# Patient Record
Sex: FEMALE | Race: WHITE | NOT HISPANIC OR LATINO | Employment: OTHER | URBAN - METROPOLITAN AREA
[De-identification: names, ages, dates, MRNs, and addresses within clinical notes are randomized per-mention and may not be internally consistent; named-entity substitution may affect disease eponyms.]

---

## 2023-04-01 DIAGNOSIS — J45.909 ASTHMA, UNSPECIFIED ASTHMA SEVERITY, UNSPECIFIED WHETHER COMPLICATED, UNSPECIFIED WHETHER PERSISTENT (HHS-HCC): Primary | ICD-10-CM

## 2023-04-12 RX ORDER — ALBUTEROL SULFATE 90 UG/1
AEROSOL, METERED RESPIRATORY (INHALATION)
Qty: 25.5 G | Refills: 6 | Status: SHIPPED | OUTPATIENT
Start: 2023-04-12

## 2023-04-17 ENCOUNTER — TELEPHONE (OUTPATIENT)
Dept: PRIMARY CARE | Facility: CLINIC | Age: 68
End: 2023-04-17
Payer: MEDICARE

## 2023-04-17 DIAGNOSIS — J06.9 URI, ACUTE: Primary | ICD-10-CM

## 2023-04-17 RX ORDER — METHYLPREDNISOLONE 4 MG/1
TABLET ORAL
Qty: 21 TABLET | Refills: 0 | Status: SHIPPED | OUTPATIENT
Start: 2023-04-17 | End: 2023-06-12 | Stop reason: ALTCHOICE

## 2023-04-17 RX ORDER — AZITHROMYCIN 250 MG/1
TABLET, FILM COATED ORAL
Qty: 6 TABLET | Refills: 0 | Status: SHIPPED | OUTPATIENT
Start: 2023-04-17 | End: 2023-04-22

## 2023-04-17 RX ORDER — BENZONATATE 200 MG/1
200 CAPSULE ORAL 3 TIMES DAILY PRN
Qty: 45 CAPSULE | Refills: 0 | Status: SHIPPED | OUTPATIENT
Start: 2023-04-17 | End: 2023-05-02

## 2023-04-17 NOTE — TELEPHONE ENCOUNTER
Pt called and states that she has a terrible cough that won't go away and it won't stop and is really bad. Pt would like to know if there is anything that you can prescribe for her. Pharmacy is updated and no allergies to any medication

## 2023-06-02 PROBLEM — F32.A ANXIETY AND DEPRESSION: Status: ACTIVE | Noted: 2023-06-02

## 2023-06-02 PROBLEM — F41.9 ANXIETY AND DEPRESSION: Status: ACTIVE | Noted: 2023-06-02

## 2023-06-02 PROBLEM — G25.81 RESTLESS LEG SYNDROME: Status: ACTIVE | Noted: 2023-06-02

## 2023-06-02 PROBLEM — R06.02 EXERTIONAL SHORTNESS OF BREATH: Status: ACTIVE | Noted: 2023-06-02

## 2023-06-02 PROBLEM — I10 BENIGN ESSENTIAL HTN: Status: ACTIVE | Noted: 2023-06-02

## 2023-06-02 PROBLEM — J44.9 CHRONIC OBSTRUCTIVE PULMONARY DISEASE (MULTI): Status: ACTIVE | Noted: 2023-06-02

## 2023-06-12 ENCOUNTER — APPOINTMENT (OUTPATIENT)
Dept: PRIMARY CARE | Facility: CLINIC | Age: 68
End: 2023-06-12
Payer: MEDICARE

## 2023-06-12 ENCOUNTER — OFFICE VISIT (OUTPATIENT)
Dept: PRIMARY CARE | Facility: CLINIC | Age: 68
End: 2023-06-12
Payer: MEDICARE

## 2023-06-12 VITALS
DIASTOLIC BLOOD PRESSURE: 84 MMHG | OXYGEN SATURATION: 96 % | SYSTOLIC BLOOD PRESSURE: 136 MMHG | HEART RATE: 84 BPM | WEIGHT: 196.4 LBS | BODY MASS INDEX: 27.39 KG/M2

## 2023-06-12 DIAGNOSIS — J44.1 COPD EXACERBATION (MULTI): ICD-10-CM

## 2023-06-12 DIAGNOSIS — J44.9 CHRONIC OBSTRUCTIVE PULMONARY DISEASE, UNSPECIFIED COPD TYPE (MULTI): Primary | ICD-10-CM

## 2023-06-12 PROBLEM — Z96.1 BILATERAL PSEUDOPHAKIA: Status: ACTIVE | Noted: 2019-04-30

## 2023-06-12 PROCEDURE — 3079F DIAST BP 80-89 MM HG: CPT | Performed by: STUDENT IN AN ORGANIZED HEALTH CARE EDUCATION/TRAINING PROGRAM

## 2023-06-12 PROCEDURE — 99214 OFFICE O/P EST MOD 30 MIN: CPT | Performed by: STUDENT IN AN ORGANIZED HEALTH CARE EDUCATION/TRAINING PROGRAM

## 2023-06-12 PROCEDURE — 94640 AIRWAY INHALATION TREATMENT: CPT | Performed by: STUDENT IN AN ORGANIZED HEALTH CARE EDUCATION/TRAINING PROGRAM

## 2023-06-12 PROCEDURE — 1160F RVW MEDS BY RX/DR IN RCRD: CPT | Performed by: STUDENT IN AN ORGANIZED HEALTH CARE EDUCATION/TRAINING PROGRAM

## 2023-06-12 PROCEDURE — 1159F MED LIST DOCD IN RCRD: CPT | Performed by: STUDENT IN AN ORGANIZED HEALTH CARE EDUCATION/TRAINING PROGRAM

## 2023-06-12 PROCEDURE — 3075F SYST BP GE 130 - 139MM HG: CPT | Performed by: STUDENT IN AN ORGANIZED HEALTH CARE EDUCATION/TRAINING PROGRAM

## 2023-06-12 RX ORDER — FLUTICASONE PROPIONATE AND SALMETEROL 100; 50 UG/1; UG/1
1 POWDER RESPIRATORY (INHALATION) 2 TIMES DAILY
COMMUNITY
End: 2023-06-12 | Stop reason: ALTCHOICE

## 2023-06-12 RX ORDER — SERTRALINE HYDROCHLORIDE 50 MG/1
50 TABLET, FILM COATED ORAL
COMMUNITY
End: 2023-06-12 | Stop reason: ALTCHOICE

## 2023-06-12 RX ORDER — ALBUTEROL SULFATE 0.63 MG/3ML
0.63 SOLUTION RESPIRATORY (INHALATION) ONCE
Status: COMPLETED | OUTPATIENT
Start: 2023-06-12 | End: 2023-06-12

## 2023-06-12 RX ORDER — CLARITHROMYCIN 500 MG/1
500 TABLET, FILM COATED ORAL 2 TIMES DAILY
Qty: 20 TABLET | Refills: 0 | Status: SHIPPED | OUTPATIENT
Start: 2023-06-12 | End: 2023-06-22

## 2023-06-12 RX ORDER — PREDNISONE 20 MG/1
TABLET ORAL
Qty: 18 TABLET | Refills: 0 | Status: SHIPPED | OUTPATIENT
Start: 2023-06-12 | End: 2023-07-26 | Stop reason: ALTCHOICE

## 2023-06-12 RX ORDER — BENZONATATE 200 MG/1
200 CAPSULE ORAL 3 TIMES DAILY PRN
Qty: 45 CAPSULE | Refills: 0 | Status: SHIPPED | OUTPATIENT
Start: 2023-06-12 | End: 2023-06-27

## 2023-06-12 RX ORDER — IPRATROPIUM BROMIDE AND ALBUTEROL SULFATE 2.5; .5 MG/3ML; MG/3ML
3 SOLUTION RESPIRATORY (INHALATION) 4 TIMES DAILY PRN
Qty: 360 ML | Refills: 11 | Status: SHIPPED | OUTPATIENT
Start: 2023-06-12 | End: 2023-06-16 | Stop reason: SDUPTHER

## 2023-06-12 RX ADMIN — ALBUTEROL SULFATE 0.63 MG: 0.63 SOLUTION RESPIRATORY (INHALATION) at 10:45

## 2023-06-12 NOTE — PROGRESS NOTES
Subjective   Patient ID: Kassy Lockett is a 68 y.o. female who presents for Cough (Cough not getting better.).    HPI comes in with about 3 to 4 months of severe coughing wheezing shortness of breath.  Up until 3 to 4 months ago Kaitlyn was working very well.  She had excessive coughing wheezing.    Review of Systems  Constitutional: Symptoms as per history of present   Eyes: no blurred vision or visual disturbance  ENT: no hearing loss, positive symptoms as per history of present illness  Cardiovascular: no chest pain, no edema, no palps and no syncope.   Respiratory: symptoms as per history of present illness  Gastrointestinal: no abdominal pain, No C/D no N/V, no blood in stools  Genitourinary: no dysuria, no change in urinary frequency, no urinary hesitancy and no feelings of urinary urgency.   Musculoskeletal: no arthralgias,  no back pain and no myalgias.   Neurological: no difficulty walking, no headache, no limb weakness, no numbness and no tingling.    Objective   /84 (BP Location: Right arm, Patient Position: Sitting, BP Cuff Size: Adult)   Pulse 84   Wt 89.1 kg (196 lb 6.4 oz)   SpO2 96%   BMI 27.39 kg/m²     Physical Exam  gen- a & o x 3, positive coughing  heent- eomi, perrla, positive fluid behind TM's however non-erythematous, positive postnasal drip, positive rhinorrhea inflamed mucous membranes in the nasopharynx, positive sinus TTP  neck- positive cervical lymphadenopathy  heart- rrr,  lungs-diffuse wheezing in all lung fields positive coughing  After nebulizer treatment lungs were much more clear  chest- symmetric, nontender  ab- soft, nontender, no organomegaly, +bowel sounds  ex's- no c/c/e  neuro- CNs 2-12 grossly intact, full sensation and strength in all ex's    Assessment/Plan     1.  Severe COPD exacerbation for 3 to 4 months.  There was an improvement with nebulizer device treatment in clinic today.    Advised on antibiotic and steroid each as directed.  Tessalon Perles as needed  for cough.  Can use Mucinex product twice per day.  Continue with your daily maintenance inhaler.  Would also advise on a baseline chest x-ray.  If no improvement with current treatment please call or follow-up we would consider pulmonology referral    2.  Nebulizer and solution has been sent to your mail order pharmacy so that you can use in the future as needed.

## 2023-06-12 NOTE — PATIENT INSTRUCTIONS
1.  Severe COPD exacerbation for 3 to 4 months.  There was an improvement with nebulizer device treatment in clinic today.    Advised on antibiotic and steroid each as directed.  Tessalon Perles as needed for cough.  Can use Mucinex product twice per day.  Continue with your daily maintenance inhaler.  Would also advise on a baseline chest x-ray.  If no improvement with current treatment please call or follow-up we would consider pulmonology referral    2.  Nebulizer and solution has been sent to your mail order pharmacy so that you can use in the future as needed.    Chance Fisher DO  for questions and f/u please call office   [employer] [phone] [facility] [phone]  any forms needed please fax   [facility] [phone] [facility] [phone]  for Physical therapy orders can call [phone]  for Radiology orders can call [phone]  for general referrals can call [phone]  for cardiac testing can call [phone]

## 2023-06-16 DIAGNOSIS — J44.1 COPD EXACERBATION (MULTI): ICD-10-CM

## 2023-06-16 DIAGNOSIS — J44.9 CHRONIC OBSTRUCTIVE PULMONARY DISEASE, UNSPECIFIED COPD TYPE (MULTI): ICD-10-CM

## 2023-06-16 RX ORDER — IPRATROPIUM BROMIDE AND ALBUTEROL SULFATE 2.5; .5 MG/3ML; MG/3ML
3 SOLUTION RESPIRATORY (INHALATION) 4 TIMES DAILY PRN
Qty: 360 ML | Refills: 1 | Status: SHIPPED | OUTPATIENT
Start: 2023-06-16 | End: 2024-06-15

## 2023-06-16 RX ORDER — NEBULIZER AND COMPRESSOR
EACH MISCELLANEOUS
COMMUNITY
Start: 2023-06-12

## 2023-06-16 NOTE — TELEPHONE ENCOUNTER
Patient called requesting the Duo-Neb solution be sent to [facility] pharmacy on file. She has been 5 days without it and she can't sleep.

## 2023-06-17 DIAGNOSIS — I10 BENIGN ESSENTIAL HTN: Primary | ICD-10-CM

## 2023-06-19 RX ORDER — METOPROLOL SUCCINATE 50 MG/1
50 TABLET, EXTENDED RELEASE ORAL DAILY
Qty: 90 TABLET | Refills: 3 | Status: SHIPPED | OUTPATIENT
Start: 2023-06-19 | End: 2024-02-24

## 2023-06-19 RX ORDER — LOSARTAN POTASSIUM AND HYDROCHLOROTHIAZIDE 25; 100 MG/1; MG/1
1 TABLET ORAL DAILY
Qty: 90 TABLET | Refills: 3 | Status: SHIPPED | OUTPATIENT
Start: 2023-06-19 | End: 2024-02-24

## 2023-06-29 ENCOUNTER — PATIENT OUTREACH (OUTPATIENT)
Dept: CARE COORDINATION | Facility: CLINIC | Age: 68
End: 2023-06-29
Payer: MEDICARE

## 2023-06-29 RX ORDER — AZITHROMYCIN 500 MG/1
500 TABLET, FILM COATED ORAL DAILY
COMMUNITY
Start: 2023-06-28 | End: 2023-07-10 | Stop reason: ALTCHOICE

## 2023-06-29 NOTE — PROGRESS NOTES
Discharge Facility: [facility] Diagnosis: COPD exacerbation  Admission Date: 6/25/23  Discharge Date: 6/28/23    PCP Appointment Date: 7/10/23  Specialist Appointment Date:  TBD will see Dr Levi  [facility] Encounter and Summary:  not available  See discharge assessment below for further details        Engagement  Call Start Time: 1153 (6/29/2023 12:03 PM)    Medications  Medications reviewed with patient/caregiver?: Yes (6/29/2023 12:03 PM)  Is the patient having any side effects they believe may be caused by any medication additions or changes?: No (6/29/2023 12:03 PM)  Does the patient have all medications ordered at discharge?: Yes (6/29/2023 12:03 PM)  Care Management Interventions: No intervention needed (6/29/2023 12:03 PM)  Prescription Comments: On prednisone taper and also azithromycin (6/29/2023 12:03 PM)  Is the patient taking all medications as directed (includes completed medication regime)?: Yes (6/29/2023 12:03 PM)    Appointments  Does the patient have a primary care provider?: Yes (6/29/2023 12:03 PM)  Care Management Interventions: Verified appointment date/time/provider (6/29/2023 12:03 PM)  Has the patient kept scheduled appointments due by today?: Yes (6/29/2023 12:03 PM)  Care Management Interventions: Advised to schedule with specialist (6/29/2023 12:03 PM)    Self Management  What Durable Medical Equipment (DME) was ordered?: home oxygen through medical servic[facility] (6/29/2023 12:03 PM)  Has all Durable Medical Equipment (DME) been delivered?: Yes (6/29/2023 12:03 PM)    Patient Teaching  Does the patient have access to their discharge instructions?: Yes (6/29/2023 12:03 PM)  Care Management Interventions: Reviewed instructions with patient (6/29/2023 12:03 PM)  What is the patient's perception of their health status since discharge?: Improving (6/29/2023 12:03 PM)  Is the patient/caregiver able to teach back the hierarchy of who to call/visit for symptoms/problems? PCP,  Specialist, Home Health nurse, Urgent Care, ED, 911: Yes (6/29/2023 12:03 PM)  Patient/Caregiver Education Comments: Aware to stay indoors due to air quality alerts and wean oxygen per directions, currently on 2.5L nasal cannula and has pulse oximeter (6/29/2023 12:03 PM)    Wrap Up  Call End Time: 1200 (6/29/2023 12:03 PM)    ,

## 2023-07-10 ENCOUNTER — OFFICE VISIT (OUTPATIENT)
Dept: PRIMARY CARE | Facility: CLINIC | Age: 68
End: 2023-07-10
Payer: MEDICARE

## 2023-07-10 VITALS
WEIGHT: 194 LBS | SYSTOLIC BLOOD PRESSURE: 110 MMHG | BODY MASS INDEX: 27.06 KG/M2 | OXYGEN SATURATION: 95 % | HEART RATE: 75 BPM | DIASTOLIC BLOOD PRESSURE: 58 MMHG

## 2023-07-10 DIAGNOSIS — R60.9 PERIPHERAL EDEMA: ICD-10-CM

## 2023-07-10 DIAGNOSIS — J44.9 CHRONIC OBSTRUCTIVE PULMONARY DISEASE, UNSPECIFIED COPD TYPE (MULTI): Primary | ICD-10-CM

## 2023-07-10 PROCEDURE — 1159F MED LIST DOCD IN RCRD: CPT | Performed by: STUDENT IN AN ORGANIZED HEALTH CARE EDUCATION/TRAINING PROGRAM

## 2023-07-10 PROCEDURE — 1125F AMNT PAIN NOTED PAIN PRSNT: CPT | Performed by: STUDENT IN AN ORGANIZED HEALTH CARE EDUCATION/TRAINING PROGRAM

## 2023-07-10 PROCEDURE — 1036F TOBACCO NON-USER: CPT | Performed by: STUDENT IN AN ORGANIZED HEALTH CARE EDUCATION/TRAINING PROGRAM

## 2023-07-10 PROCEDURE — 1160F RVW MEDS BY RX/DR IN RCRD: CPT | Performed by: STUDENT IN AN ORGANIZED HEALTH CARE EDUCATION/TRAINING PROGRAM

## 2023-07-10 PROCEDURE — 3074F SYST BP LT 130 MM HG: CPT | Performed by: STUDENT IN AN ORGANIZED HEALTH CARE EDUCATION/TRAINING PROGRAM

## 2023-07-10 PROCEDURE — 3078F DIAST BP <80 MM HG: CPT | Performed by: STUDENT IN AN ORGANIZED HEALTH CARE EDUCATION/TRAINING PROGRAM

## 2023-07-10 PROCEDURE — 99495 TRANSJ CARE MGMT MOD F2F 14D: CPT | Performed by: STUDENT IN AN ORGANIZED HEALTH CARE EDUCATION/TRAINING PROGRAM

## 2023-07-10 RX ORDER — FUROSEMIDE 20 MG/1
10 TABLET ORAL DAILY PRN
Qty: 45 TABLET | Refills: 3 | Status: SHIPPED | OUTPATIENT
Start: 2023-07-10 | End: 2024-05-22

## 2023-07-10 RX ORDER — FUROSEMIDE 20 MG/1
10 TABLET ORAL DAILY PRN
Qty: 45 TABLET | Refills: 3 | Status: SHIPPED | OUTPATIENT
Start: 2023-07-10 | End: 2023-07-10 | Stop reason: SDUPTHER

## 2023-07-10 ASSESSMENT — ENCOUNTER SYMPTOMS
LOSS OF SENSATION IN FEET: 1
DEPRESSION: 0
OCCASIONAL FEELINGS OF UNSTEADINESS: 1

## 2023-07-10 ASSESSMENT — PAIN SCALES - GENERAL: PAINLEVEL: 2

## 2023-07-10 NOTE — PATIENT INSTRUCTIONS
1.  Hospital follow-up after COPD exacerbation.  Now 11 days status post discharge.  She has completed antibiotic she is finishing her prednisone taper.  Compliant with daily Trelegy.  Using nebulizer twice per day.  Stable oxygen on room air she is rarely using her oxygen at home.  Continue slow improvement.  She did quit smoking over 3 years ago.  She will make follow-up with pulmonology to establish care.    2.  Bilateral peripheral edema in ankles.  Prescription for low-dose Lasix to use as needed.

## 2023-07-10 NOTE — PROGRESS NOTES
Subjective   Patient ID: Kassy Lockett is a 68 y.o. female who presents for Hospital Follow-up.    HPI comes in for hospital follow-up after COPD exacerbation and hospital admission.  She finished outpatient antibiotics 2 days ago she still has several days left of prednisone taper.  She is improving she still has some cough.  She is rarely using oxygen at home    Review of Systems  Constitutional: Symptoms as per history of present   Eyes: no blurred vision or visual disturbance  ENT: no hearing loss, positive symptoms as per history of present illness  Cardiovascular: no chest pain, no edema, no palps and no syncope.   Respiratory: symptoms as per history of present illness  Gastrointestinal: no abdominal pain, No C/D no N/V, no blood in stools  Genitourinary: no dysuria, no change in urinary frequency, no urinary hesitancy and no feelings of urinary urgency.   Musculoskeletal: no arthralgias,  no back pain and no myalgias.   Neurological: no difficulty walking, no headache, no limb weakness, no numbness and no tingling.    Objective   /58 (BP Location: Right arm, Patient Position: Sitting, BP Cuff Size: Adult)   Pulse 75   Wt 88 kg (194 lb)   SpO2 95%   BMI 27.06 kg/m²     Physical Exam  gen- a & o x 3, positive coughing  heent- eomi, perrla, positive fluid behind TM's however non-erythematous, positive postnasal drip,   neck- positive cervical lymphadenopathy  heart- rrr, no murmurs  lungs- cta b/l , no w/r/r  chest- symmetric, nontender  ab- soft, nontender, no organomegaly, +bowel sounds  ex's- no c/c/e  neuro- CNs 2-12 grossly intact, full sensation and strength in all ex's    Assessment/Plan     1.  Hospital follow-up after COPD exacerbation.  Now 11 days status post discharge.  She has completed antibiotic she is finishing her prednisone taper.  Compliant with daily Trelegy.  Using nebulizer twice per day.  Stable oxygen on room air she is rarely using her oxygen at home.  Continue slow  improvement.  She did quit smoking over 3 years ago.  She will make follow-up with pulmonology to establish care.    2.  Bilateral peripheral edema in ankles.  Prescription for low-dose Lasix to use as needed.

## 2023-07-13 ENCOUNTER — PATIENT OUTREACH (OUTPATIENT)
Dept: CARE COORDINATION | Facility: CLINIC | Age: 68
End: 2023-07-13
Payer: MEDICARE

## 2023-07-13 NOTE — PROGRESS NOTES
Call regarding appt. with PCP on 7/10/23 after hospitalization.  At time of outreach call the patient feels as if their condition has improved since last visit.  Reviewed the PCP appointment with the pt and addressed any questions or concerns. Has appt with pulmonary lined up.

## 2023-07-24 DIAGNOSIS — J44.9 CHRONIC OBSTRUCTIVE PULMONARY DISEASE, UNSPECIFIED COPD TYPE (MULTI): ICD-10-CM

## 2023-07-24 RX ORDER — FLUTICASONE FUROATE, UMECLIDINIUM BROMIDE AND VILANTEROL TRIFENATATE 200; 62.5; 25 UG/1; UG/1; UG/1
1 POWDER RESPIRATORY (INHALATION) DAILY
Qty: 180 EACH | Refills: 3 | Status: SHIPPED | OUTPATIENT
Start: 2023-07-24 | End: 2024-05-21

## 2023-07-26 ENCOUNTER — OFFICE VISIT (OUTPATIENT)
Dept: PRIMARY CARE | Facility: CLINIC | Age: 68
End: 2023-07-26
Payer: MEDICARE

## 2023-07-26 VITALS
OXYGEN SATURATION: 95 % | WEIGHT: 194.2 LBS | DIASTOLIC BLOOD PRESSURE: 72 MMHG | SYSTOLIC BLOOD PRESSURE: 120 MMHG | BODY MASS INDEX: 27.09 KG/M2 | HEART RATE: 85 BPM

## 2023-07-26 DIAGNOSIS — L03.115 CELLULITIS OF RIGHT LOWER EXTREMITY: ICD-10-CM

## 2023-07-26 DIAGNOSIS — I10 BENIGN ESSENTIAL HTN: ICD-10-CM

## 2023-07-26 DIAGNOSIS — I87.2 VENOUS STASIS DERMATITIS OF BOTH LOWER EXTREMITIES: ICD-10-CM

## 2023-07-26 DIAGNOSIS — R60.9 PERIPHERAL EDEMA: Primary | ICD-10-CM

## 2023-07-26 PROCEDURE — 3074F SYST BP LT 130 MM HG: CPT | Performed by: STUDENT IN AN ORGANIZED HEALTH CARE EDUCATION/TRAINING PROGRAM

## 2023-07-26 PROCEDURE — 3078F DIAST BP <80 MM HG: CPT | Performed by: STUDENT IN AN ORGANIZED HEALTH CARE EDUCATION/TRAINING PROGRAM

## 2023-07-26 PROCEDURE — 99214 OFFICE O/P EST MOD 30 MIN: CPT | Performed by: STUDENT IN AN ORGANIZED HEALTH CARE EDUCATION/TRAINING PROGRAM

## 2023-07-26 PROCEDURE — 1125F AMNT PAIN NOTED PAIN PRSNT: CPT | Performed by: STUDENT IN AN ORGANIZED HEALTH CARE EDUCATION/TRAINING PROGRAM

## 2023-07-26 PROCEDURE — 1036F TOBACCO NON-USER: CPT | Performed by: STUDENT IN AN ORGANIZED HEALTH CARE EDUCATION/TRAINING PROGRAM

## 2023-07-26 PROCEDURE — 1160F RVW MEDS BY RX/DR IN RCRD: CPT | Performed by: STUDENT IN AN ORGANIZED HEALTH CARE EDUCATION/TRAINING PROGRAM

## 2023-07-26 PROCEDURE — 1159F MED LIST DOCD IN RCRD: CPT | Performed by: STUDENT IN AN ORGANIZED HEALTH CARE EDUCATION/TRAINING PROGRAM

## 2023-07-26 RX ORDER — CEPHALEXIN 500 MG/1
500 CAPSULE ORAL 3 TIMES DAILY
Qty: 30 CAPSULE | Refills: 0 | Status: SHIPPED | OUTPATIENT
Start: 2023-07-26 | End: 2023-08-02 | Stop reason: SDUPTHER

## 2023-07-26 RX ORDER — ALBUTEROL SULFATE 90 UG/1
2 AEROSOL, METERED RESPIRATORY (INHALATION) EVERY 6 HOURS PRN
COMMUNITY
Start: 2023-06-25 | End: 2023-07-26 | Stop reason: SDUPTHER

## 2023-07-26 ASSESSMENT — ENCOUNTER SYMPTOMS: DEPRESSION: 0

## 2023-07-26 ASSESSMENT — PAIN SCALES - GENERAL: PAINLEVEL: 8

## 2023-07-26 NOTE — PATIENT INSTRUCTIONS
1.  Continues to have the lower extremity peripheral edema, venous stasis dermatitis changes.  And recently has developed cellulitis of the right lower extremity is warm red and tender to touch.  Advised on Keflex p.o. 3 times daily x10 days.  At this time increase your Lasix to 1 full pill per day 20 mg.  Also consider getting some compression stockings.  See if this helps with your leg swelling over the next several weeks continue to watch your salt load.    2.  She did have an echocardiogram during her hospital admission.  Her last BNP was slightly elevated at 250 about 1 month ago.  If the swelling cannot improve on the higher dose of Lasix over the next 1 to 2 months we would consider another work-up including repeat echocardiogram and repeat labs.

## 2023-07-26 NOTE — PROGRESS NOTES
Subjective   Patient ID: Kassy Lockett is a 68 y.o. female who presents for Leg Swelling (Both legs are red and swollen.).    HPI still having some issues with peripheral edema not much improvement on low-dose Lasix.  Now she has pain in the right lower extremity redness tenderness.    Review of Systems  Constitutional: Symptoms as per history of present   Eyes: no blurred vision or visual disturbance  ENT: no hearing loss, positive symptoms as per history of present illness  Cardiovascular: no chest pain, no edema, no palps and no syncope.   Respiratory: symptoms as per history of present illness  Gastrointestinal: no abdominal pain, No C/D no N/V, no blood in stools  Genitourinary: no dysuria, no change in urinary frequency, no urinary hesitancy and no feelings of urinary urgency.   Musculoskeletal: no arthralgias,  no back pain and no myalgias.   Neurological: no difficulty walking, no headache, no limb weakness, no numbness and no tingling.  Derm-started to have cellulitis issues right lower extremity  Objective   /72 (BP Location: Right arm, Patient Position: Sitting, BP Cuff Size: Adult)   Pulse 85   Wt 88.1 kg (194 lb 3.2 oz)   SpO2 95%   BMI 27.09 kg/m²     Physical Exam  gen- a & o x 3, nad, pleasant  heent- eomi, perrla, ear canals patent, TM's non-erythematous, no fluid, frontal and maxillary sinus's nontender  neck- supple, nontender, no palpable or enlarged nodes, no thyromegaly  heart- rrr,   lungs- cta b/l , no w/r/r  chest- symmetric, nontender  ab- soft, nontender, no palpable organomegaly, postive bowel sounds  ex's-positive 1+ pitting edema bilateral lower extremities to the mid calf  skin-positive cellulitis of the right calf and anterior shin senior living up to the knee  Assessment/Plan     1.  Continues to have the lower extremity peripheral edema, venous stasis dermatitis changes.  And recently has developed cellulitis of the right lower extremity is warm red and tender to touch.  Advised  on Keflex p.o. 3 times daily x10 days.  At this time increase your Lasix to 1 full pill per day 20 mg.  Also consider getting some compression stockings.  See if this helps with your leg swelling over the next several weeks continue to watch your salt load.    2.  She did have an echocardiogram during her hospital admission.  Her last BNP was slightly elevated at 250 about 1 month ago.  If the swelling cannot improve on the higher dose of Lasix over the next 1 to 2 months we would consider another work-up including repeat echocardiogram and repeat labs.

## 2023-08-02 ENCOUNTER — TELEPHONE (OUTPATIENT)
Dept: PRIMARY CARE | Facility: CLINIC | Age: 68
End: 2023-08-02
Payer: MEDICARE

## 2023-08-02 DIAGNOSIS — L03.115 CELLULITIS OF RIGHT LOWER EXTREMITY: ICD-10-CM

## 2023-08-02 RX ORDER — CEPHALEXIN 500 MG/1
500 CAPSULE ORAL 3 TIMES DAILY
Qty: 30 CAPSULE | Refills: 0 | Status: SHIPPED | OUTPATIENT
Start: 2023-08-02 | End: 2023-08-12

## 2023-08-02 NOTE — TELEPHONE ENCOUNTER
Pt called and states that the Keflex is working and she is almost done with it and would like to know if you could send her another round of it just to make sure that the infection is gone. Pharmacy is [facility] on [address] in [place].

## 2023-08-17 ENCOUNTER — PATIENT OUTREACH (OUTPATIENT)
Dept: CARE COORDINATION | Facility: CLINIC | Age: 68
End: 2023-08-17
Payer: MEDICARE

## 2023-08-17 NOTE — PROGRESS NOTES
"Call placed regarding one month post discharge follow up call.  At time of outreach call the patient feels as if their condition has worsened since initial visit with PCP or specialist.  Questions or concerns regarding recovery period addressed at this time.   Reviewed any PCP or specialists progress notes/labs/radiology reports if applicable and addressed any questions or concerns.     Patient states she has completed both rounds of keflex on 8/11/23 and her RLE is still warm, red, swollen, tender to touch with \"orange peel\" textured rough skin. Some water blisters have formed and started to rupture as well. No fevers. Scheduled patient for next avail 8/21 with PCP but will notify him of these concerns to see if other intervention is needed sooner.    " FS qAC and qHS with sliding scale coverage.  A1c 6.4.  Hold Januvia while inpatient, resume on discharge.  ADA diet.

## 2023-08-21 ENCOUNTER — OFFICE VISIT (OUTPATIENT)
Dept: PRIMARY CARE | Facility: CLINIC | Age: 68
End: 2023-08-21
Payer: MEDICARE

## 2023-08-21 VITALS
HEART RATE: 73 BPM | WEIGHT: 189.6 LBS | BODY MASS INDEX: 26.44 KG/M2 | SYSTOLIC BLOOD PRESSURE: 100 MMHG | DIASTOLIC BLOOD PRESSURE: 58 MMHG | OXYGEN SATURATION: 95 %

## 2023-08-21 DIAGNOSIS — R09.89 OTHER SPECIFIED SYMPTOMS AND SIGNS INVOLVING THE CIRCULATORY AND RESPIRATORY SYSTEMS: ICD-10-CM

## 2023-08-21 DIAGNOSIS — G62.89 OTHER POLYNEUROPATHY: ICD-10-CM

## 2023-08-21 DIAGNOSIS — I87.2 VENOUS STASIS DERMATITIS OF BOTH LOWER EXTREMITIES: ICD-10-CM

## 2023-08-21 DIAGNOSIS — R60.9 PERIPHERAL EDEMA: Primary | ICD-10-CM

## 2023-08-21 PROCEDURE — 3074F SYST BP LT 130 MM HG: CPT | Performed by: STUDENT IN AN ORGANIZED HEALTH CARE EDUCATION/TRAINING PROGRAM

## 2023-08-21 PROCEDURE — 1159F MED LIST DOCD IN RCRD: CPT | Performed by: STUDENT IN AN ORGANIZED HEALTH CARE EDUCATION/TRAINING PROGRAM

## 2023-08-21 PROCEDURE — 99214 OFFICE O/P EST MOD 30 MIN: CPT | Performed by: STUDENT IN AN ORGANIZED HEALTH CARE EDUCATION/TRAINING PROGRAM

## 2023-08-21 PROCEDURE — 1160F RVW MEDS BY RX/DR IN RCRD: CPT | Performed by: STUDENT IN AN ORGANIZED HEALTH CARE EDUCATION/TRAINING PROGRAM

## 2023-08-21 PROCEDURE — 1036F TOBACCO NON-USER: CPT | Performed by: STUDENT IN AN ORGANIZED HEALTH CARE EDUCATION/TRAINING PROGRAM

## 2023-08-21 PROCEDURE — 3078F DIAST BP <80 MM HG: CPT | Performed by: STUDENT IN AN ORGANIZED HEALTH CARE EDUCATION/TRAINING PROGRAM

## 2023-08-21 PROCEDURE — 1125F AMNT PAIN NOTED PAIN PRSNT: CPT | Performed by: STUDENT IN AN ORGANIZED HEALTH CARE EDUCATION/TRAINING PROGRAM

## 2023-08-21 RX ORDER — GABAPENTIN 100 MG/1
100 CAPSULE ORAL 3 TIMES DAILY
Qty: 90 CAPSULE | Refills: 2 | Status: SHIPPED | OUTPATIENT
Start: 2023-08-21 | End: 2023-10-02 | Stop reason: ALTCHOICE

## 2023-08-21 ASSESSMENT — PAIN SCALES - GENERAL: PAINLEVEL: 6

## 2023-08-21 NOTE — PATIENT INSTRUCTIONS
1.  Still having some slow healing wounds of the right lower extremity.  She is admitted to numbness tingling in her feet.  Symptoms of neuropathy for several years.  Concern for arterial blood flow problems.  Peripheral vascular arterial studies ordered today.  Also chronic neuropathy symptoms, EMGs ordered today.    Can try gabapentin p.o. 3 times daily as needed for nerve pain see if symptoms are improved.  Currently no signs of cellulitis

## 2023-08-21 NOTE — PROGRESS NOTES
Subjective   Patient ID: Kassy Lockett is a 68 y.o. female who presents for Cellulitis (Right leg.).    HPI admits to chronic bilateral lower extremity numbness tingling.  Chronic right lower extremity pain nerve pain burning sharp at times symptoms for about 5 years.  Also she has had some mild peripheral edema venous stasis dermatitis changes.  Comes in concern for slow healing wounds of the right lower extremity    Review of Systems  Constitutional: NO F, chills, or sweats  Eyes: no blurred vision or visual disturbance  ENT: no hearing loss, no congestion, no nasal discharge, no hoarseness and no sore throat.   Cardiovascular: no chest pain, no edema, no palps and no syncope.   Respiratory: no cough,no s.o.b. and no wheezing  Gastrointestinal: no abdominal pain, No C/D no N/V, no blood in stools  Genitourinary: no dysuria, no change in urinary frequency, no urinary hesitancy and no feelings of urinary urgency.   Musculoskeletal: no arthralgias,  no back pain and no myalgias.   Integumentary: Slow healing wounds right lower extremity recurrent cellulitis  Neurological: no difficulty walking, no headache, chronic neuropathy symptoms of the lower extremities  Objective   /58 (BP Location: Right arm, Patient Position: Sitting, BP Cuff Size: Adult)   Pulse 73   Wt 86 kg (189 lb 9.6 oz)   SpO2 95%   BMI 26.44 kg/m²     Physical Exam  gen- a & o x 3, nad, pleasant    neuro- CNs 2-12 grossly intact, decreased sensation to light touch bilateral feet,  skin-bilateral venous stasis dermatitis changes mild edema in the ankles and mid calves  Assessment/Plan     1.  Still having some slow healing wounds of the right lower extremity.  She is admitted to numbness tingling in her feet.  Symptoms of neuropathy for several years.  Concern for arterial blood flow problems.  Peripheral vascular arterial studies ordered today.  Also chronic neuropathy symptoms, EMGs ordered today.    Can try gabapentin p.o. 3 times daily as  needed for nerve pain see if symptoms are improved.  Currently no signs of cellulitis

## 2023-08-22 ENCOUNTER — TELEPHONE (OUTPATIENT)
Dept: PRIMARY CARE | Facility: CLINIC | Age: 68
End: 2023-08-22
Payer: MEDICARE

## 2023-08-22 NOTE — TELEPHONE ENCOUNTER
Pt called & is trying to schedule emg at [place].  I am not seeing the order in   The system  Please advise  Ty  [phone]

## 2023-08-24 ENCOUNTER — TELEPHONE (OUTPATIENT)
Dept: PRIMARY CARE | Facility: CLINIC | Age: 68
End: 2023-08-24
Payer: MEDICARE

## 2023-09-15 ENCOUNTER — TELEPHONE (OUTPATIENT)
Dept: PRIMARY CARE | Facility: CLINIC | Age: 68
End: 2023-09-15
Payer: MEDICARE

## 2023-09-15 DIAGNOSIS — L03.115 CELLULITIS OF RIGHT LOWER EXTREMITY: Primary | ICD-10-CM

## 2023-09-15 RX ORDER — SULFAMETHOXAZOLE AND TRIMETHOPRIM 800; 160 MG/1; MG/1
1 TABLET ORAL 2 TIMES DAILY
Qty: 20 TABLET | Refills: 0 | Status: SHIPPED | OUTPATIENT
Start: 2023-09-15 | End: 2023-10-02 | Stop reason: SDUPTHER

## 2023-09-15 NOTE — TELEPHONE ENCOUNTER
Pt called and states that she has a vascular USPER appt on the 21st. Pt states that she has open sores on legs that are oozing yellow stuff and very pain. Pt would like to know if they would still do the test with her legs like that. Pt would like to know if you do not know if they will still do the test, who should she call to find out.

## 2023-09-21 ENCOUNTER — PATIENT OUTREACH (OUTPATIENT)
Dept: CARE COORDINATION | Facility: CLINIC | Age: 68
End: 2023-09-21
Payer: MEDICARE

## 2023-09-21 NOTE — PROGRESS NOTES
90 day (final) call post hospital stay completed.  This CM called and spoke with pt via phone to address any final questions or concerns regarding their recovery period.  Pt reports doing better than she had last week, concerns of slow healing cellulitis to R leg.  She states that she has started washing with peroxide twice daily and it seems to be drying up her infection and the tissue is sloughing off. She admits this antibiotic is helping more than the previous one was. Encouraged her to talk with Dr Fisher at upcoming appt 10/2 if leg is not healed perhaps a wound clinic referral for [facility] would be needed to heal her leg enough to be able to tolerate the vascular ultrasound studies she had to cancel.

## 2023-09-27 ENCOUNTER — APPOINTMENT (OUTPATIENT)
Dept: PRIMARY CARE | Facility: CLINIC | Age: 68
End: 2023-09-27
Payer: MEDICARE

## 2023-10-02 ENCOUNTER — OFFICE VISIT (OUTPATIENT)
Dept: PRIMARY CARE | Facility: CLINIC | Age: 68
End: 2023-10-02
Payer: MEDICARE

## 2023-10-02 VITALS
BODY MASS INDEX: 26.5 KG/M2 | DIASTOLIC BLOOD PRESSURE: 78 MMHG | SYSTOLIC BLOOD PRESSURE: 134 MMHG | WEIGHT: 190 LBS | OXYGEN SATURATION: 95 % | HEART RATE: 67 BPM

## 2023-10-02 DIAGNOSIS — Z13.6 ENCOUNTER FOR SCREENING FOR CARDIOVASCULAR DISORDERS: ICD-10-CM

## 2023-10-02 DIAGNOSIS — Z13.29 SCREENING FOR THYROID DISORDER: ICD-10-CM

## 2023-10-02 DIAGNOSIS — Z23 IMMUNIZATION DUE: ICD-10-CM

## 2023-10-02 DIAGNOSIS — Z13.0 SCREENING FOR DEFICIENCY ANEMIA: ICD-10-CM

## 2023-10-02 DIAGNOSIS — R53.83 OTHER FATIGUE: ICD-10-CM

## 2023-10-02 DIAGNOSIS — Z00.00 WELLNESS EXAMINATION: Primary | ICD-10-CM

## 2023-10-02 DIAGNOSIS — Z12.11 COLON CANCER SCREENING: ICD-10-CM

## 2023-10-02 DIAGNOSIS — Z12.31 ENCOUNTER FOR SCREENING MAMMOGRAM FOR MALIGNANT NEOPLASM OF BREAST: ICD-10-CM

## 2023-10-02 DIAGNOSIS — E55.9 VITAMIN D DEFICIENCY: ICD-10-CM

## 2023-10-02 DIAGNOSIS — G62.89 OTHER POLYNEUROPATHY: ICD-10-CM

## 2023-10-02 DIAGNOSIS — L03.115 CELLULITIS OF RIGHT LOWER EXTREMITY: ICD-10-CM

## 2023-10-02 PROCEDURE — 1160F RVW MEDS BY RX/DR IN RCRD: CPT | Performed by: STUDENT IN AN ORGANIZED HEALTH CARE EDUCATION/TRAINING PROGRAM

## 2023-10-02 PROCEDURE — 99214 OFFICE O/P EST MOD 30 MIN: CPT | Performed by: STUDENT IN AN ORGANIZED HEALTH CARE EDUCATION/TRAINING PROGRAM

## 2023-10-02 PROCEDURE — 1036F TOBACCO NON-USER: CPT | Performed by: STUDENT IN AN ORGANIZED HEALTH CARE EDUCATION/TRAINING PROGRAM

## 2023-10-02 PROCEDURE — G0439 PPPS, SUBSEQ VISIT: HCPCS | Performed by: STUDENT IN AN ORGANIZED HEALTH CARE EDUCATION/TRAINING PROGRAM

## 2023-10-02 PROCEDURE — 3075F SYST BP GE 130 - 139MM HG: CPT | Performed by: STUDENT IN AN ORGANIZED HEALTH CARE EDUCATION/TRAINING PROGRAM

## 2023-10-02 PROCEDURE — 1170F FXNL STATUS ASSESSED: CPT | Performed by: STUDENT IN AN ORGANIZED HEALTH CARE EDUCATION/TRAINING PROGRAM

## 2023-10-02 PROCEDURE — 1125F AMNT PAIN NOTED PAIN PRSNT: CPT | Performed by: STUDENT IN AN ORGANIZED HEALTH CARE EDUCATION/TRAINING PROGRAM

## 2023-10-02 PROCEDURE — 90677 PCV20 VACCINE IM: CPT | Performed by: STUDENT IN AN ORGANIZED HEALTH CARE EDUCATION/TRAINING PROGRAM

## 2023-10-02 PROCEDURE — G0009 ADMIN PNEUMOCOCCAL VACCINE: HCPCS | Performed by: STUDENT IN AN ORGANIZED HEALTH CARE EDUCATION/TRAINING PROGRAM

## 2023-10-02 PROCEDURE — 1159F MED LIST DOCD IN RCRD: CPT | Performed by: STUDENT IN AN ORGANIZED HEALTH CARE EDUCATION/TRAINING PROGRAM

## 2023-10-02 PROCEDURE — 3078F DIAST BP <80 MM HG: CPT | Performed by: STUDENT IN AN ORGANIZED HEALTH CARE EDUCATION/TRAINING PROGRAM

## 2023-10-02 RX ORDER — GABAPENTIN 300 MG/1
300 CAPSULE ORAL 3 TIMES DAILY
Qty: 90 CAPSULE | Refills: 5 | Status: SHIPPED | OUTPATIENT
Start: 2023-10-02 | End: 2023-12-21 | Stop reason: SDUPTHER

## 2023-10-02 RX ORDER — SULFAMETHOXAZOLE AND TRIMETHOPRIM 800; 160 MG/1; MG/1
1 TABLET ORAL 2 TIMES DAILY
Qty: 60 TABLET | Refills: 0 | Status: SHIPPED | OUTPATIENT
Start: 2023-10-02 | End: 2023-11-01

## 2023-10-02 ASSESSMENT — PATIENT HEALTH QUESTIONNAIRE - PHQ9
SUM OF ALL RESPONSES TO PHQ9 QUESTIONS 1 AND 2: 0
2. FEELING DOWN, DEPRESSED OR HOPELESS: NOT AT ALL
1. LITTLE INTEREST OR PLEASURE IN DOING THINGS: NOT AT ALL

## 2023-10-02 ASSESSMENT — ACTIVITIES OF DAILY LIVING (ADL)
DOING_HOUSEWORK: INDEPENDENT
DRESSING: INDEPENDENT
BATHING: INDEPENDENT
MANAGING_FINANCES: INDEPENDENT
GROCERY_SHOPPING: INDEPENDENT
TAKING_MEDICATION: INDEPENDENT

## 2023-10-02 ASSESSMENT — ENCOUNTER SYMPTOMS
DEPRESSION: 0
LOSS OF SENSATION IN FEET: 0
OCCASIONAL FEELINGS OF UNSTEADINESS: 0

## 2023-10-02 ASSESSMENT — PAIN SCALES - GENERAL: PAINLEVEL: 8

## 2023-10-02 NOTE — PATIENT INSTRUCTIONS
1.  Medicare wellness visit.  No concerns on exam.  Screening labs ordered today.  Due for mammogram ordered today.  Cologuard ordered today.  Prevnar 20 given today.    2.  She is a chronic right lower extremity wound.  It was recently responding to Bactrim.  Prescription for Bactrim again to take twice per day until wound resolves.  Also she has chronic lower extremity neuropathy symptoms.  We are ordering tests that are pending for vascular testing as well as EMG of the lower extremities.    3.  For the chronic neuropathy.  We will increase her dose of gabapentin to 300 mg 3 times per day.    4. Chronic COPD.  Stable on current medications.

## 2023-10-02 NOTE — PROGRESS NOTES
Subjective   Patient ID: Kassy Lockett is a 68 y.o. female who presents for Medicare Annual Wellness Visit Subsequent (She is not fasting.).    HPI comes in for Medicare wellness    Review of Systems  Constitutional: NO F, chills, or sweats  Eyes: no blurred vision or visual disturbance  ENT: no hearing loss, no congestion, no nasal discharge, no hoarseness and no sore throat.   Cardiovascular: no chest pain, no edema, no palps and no syncope.   Respiratory: no cough,no s.o.b. and no wheezing  Gastrointestinal: no abdominal pain, No C/D no N/V, no blood in stools  Genitourinary: no dysuria, no change in urinary frequency, no urinary hesitancy and no feelings of urinary urgency.   Musculoskeletal: no arthralgias,  no back pain and no myalgias.   Integumentary: Chronic wound infection right lower extremity for over 3 months  Neurological: no difficulty walking, no headache, no limb weakness, no numbness and no tingling.   Psychiatric: no anxiety, no depression, no anhedonia and no substance use disorders.   Endocrine: no recent weight gain and no recent weight loss.   Hematologic/Lymphatic: no tendency for easy bruising and no swollen glands.  Objective   /78 (BP Location: Right arm, Patient Position: Sitting, BP Cuff Size: Adult)   Pulse 67   Wt 86.2 kg (190 lb)   SpO2 95%   BMI 26.50 kg/m²     Physical Exam  gen- a & o x 3, nad, pleasant  heent- eomi, perrla, ear canals patent, TM's non-erythematous, no fluid, frontal and maxillary sinus's nontender  neck- supple, nontender, no palpable or enlarged nodes, no thyromegaly  heart- rrr, no murmurs  lungs- cta b/l , no w/r/r  chest- symmetric, nontender  ab- soft, nontender, no palpable organomegaly, postive bowel sounds  ex's- no c/c/e  neuro- CNs 2-12 grossly intact, full sensation and strength in all extremities  skin-slow healing wound right lower extremity posterior calf mild surrounding cellulitis  Assessment/Plan     1.  Medicare wellness visit.  No  concerns on exam.  Screening labs ordered today.  Due for mammogram ordered today.  Cologuard ordered today.  Prevnar 20 given today.    2.  She is a chronic right lower extremity wound.  It was recently responding to Bactrim.  Prescription for Bactrim again to take twice per day until wound resolves.  Also she has chronic lower extremity neuropathy symptoms.  We are ordering tests that are pending for vascular testing as well as EMG of the lower extremities.    3.  For the chronic neuropathy.  We will increase her dose of gabapentin to 300 mg 3 times per day.    4. Chronic COPD.  Stable on current medications.

## 2023-10-13 ENCOUNTER — TELEPHONE (OUTPATIENT)
Dept: PRIMARY CARE | Facility: CLINIC | Age: 68
End: 2023-10-13
Payer: MEDICARE

## 2023-10-13 DIAGNOSIS — I87.2 VENOUS STASIS DERMATITIS OF BOTH LOWER EXTREMITIES: Primary | ICD-10-CM

## 2023-10-13 DIAGNOSIS — R09.89 OTHER SPECIFIED SYMPTOMS AND SIGNS INVOLVING THE CIRCULATORY AND RESPIRATORY SYSTEMS: ICD-10-CM

## 2023-10-13 DIAGNOSIS — I70.213 ATHEROSCLEROSIS OF NATIVE ARTERIES OF EXTREMITIES WITH INTERMITTENT CLAUDICATION, BILATERAL LEGS (CMS-HCC): ICD-10-CM

## 2023-10-13 DIAGNOSIS — R60.9 PERIPHERAL EDEMA: ICD-10-CM

## 2023-10-13 NOTE — TELEPHONE ENCOUNTER
Pt called and states that she had to cancel her appt for her vascular US PVR due to having a wound on her leg. Pt states that when she tried to reschedule it they told her that she needs a new referral placed. Pt would like to know if you can place another referral for the Vascular US PVR without exercise.

## 2023-10-14 ENCOUNTER — ANCILLARY PROCEDURE (OUTPATIENT)
Dept: RADIOLOGY | Facility: CLINIC | Age: 68
End: 2023-10-14
Payer: MEDICARE

## 2023-10-14 DIAGNOSIS — R92.2 INCONCLUSIVE MAMMOGRAM: ICD-10-CM

## 2023-10-14 DIAGNOSIS — Z12.31 ENCOUNTER FOR SCREENING MAMMOGRAM FOR MALIGNANT NEOPLASM OF BREAST: ICD-10-CM

## 2023-10-14 PROCEDURE — 77063 BREAST TOMOSYNTHESIS BI: CPT | Mod: BILATERAL PROCEDURE | Performed by: RADIOLOGY

## 2023-10-14 PROCEDURE — 77067 SCR MAMMO BI INCL CAD: CPT | Mod: BILATERAL PROCEDURE | Performed by: RADIOLOGY

## 2023-10-14 PROCEDURE — 77067 SCR MAMMO BI INCL CAD: CPT | Mod: 50

## 2023-10-19 ENCOUNTER — ANCILLARY PROCEDURE (OUTPATIENT)
Dept: RADIOLOGY | Facility: CLINIC | Age: 68
End: 2023-10-19
Payer: MEDICARE

## 2023-10-19 ENCOUNTER — LAB (OUTPATIENT)
Dept: LAB | Facility: LAB | Age: 68
End: 2023-10-19
Payer: MEDICARE

## 2023-10-19 ENCOUNTER — HOSPITAL ENCOUNTER (OUTPATIENT)
Dept: VASCULAR MEDICINE | Facility: HOSPITAL | Age: 68
Discharge: HOME | End: 2023-10-19
Payer: MEDICARE

## 2023-10-19 ENCOUNTER — APPOINTMENT (OUTPATIENT)
Dept: VASCULAR MEDICINE | Facility: HOSPITAL | Age: 68
End: 2023-10-19
Payer: MEDICARE

## 2023-10-19 DIAGNOSIS — I87.2 VENOUS STASIS DERMATITIS OF BOTH LOWER EXTREMITIES: ICD-10-CM

## 2023-10-19 DIAGNOSIS — R60.9 PERIPHERAL EDEMA: ICD-10-CM

## 2023-10-19 DIAGNOSIS — R09.89 OTHER SPECIFIED SYMPTOMS AND SIGNS INVOLVING THE CIRCULATORY AND RESPIRATORY SYSTEMS: ICD-10-CM

## 2023-10-19 DIAGNOSIS — Z13.6 ENCOUNTER FOR SCREENING FOR CARDIOVASCULAR DISORDERS: ICD-10-CM

## 2023-10-19 DIAGNOSIS — I70.213 ATHEROSCLEROSIS OF NATIVE ARTERIES OF EXTREMITIES WITH INTERMITTENT CLAUDICATION, BILATERAL LEGS (CMS-HCC): ICD-10-CM

## 2023-10-19 DIAGNOSIS — Z12.31 ENCOUNTER FOR SCREENING MAMMOGRAM FOR MALIGNANT NEOPLASM OF BREAST: ICD-10-CM

## 2023-10-19 DIAGNOSIS — E55.9 VITAMIN D DEFICIENCY: ICD-10-CM

## 2023-10-19 DIAGNOSIS — Z13.29 SCREENING FOR THYROID DISORDER: ICD-10-CM

## 2023-10-19 DIAGNOSIS — Z00.00 WELLNESS EXAMINATION: ICD-10-CM

## 2023-10-19 DIAGNOSIS — Z12.11 COLON CANCER SCREENING: ICD-10-CM

## 2023-10-19 DIAGNOSIS — Z23 IMMUNIZATION DUE: ICD-10-CM

## 2023-10-19 DIAGNOSIS — R92.2 INCONCLUSIVE MAMMOGRAM: ICD-10-CM

## 2023-10-19 DIAGNOSIS — Z13.0 SCREENING FOR DEFICIENCY ANEMIA: ICD-10-CM

## 2023-10-19 DIAGNOSIS — R53.83 OTHER FATIGUE: ICD-10-CM

## 2023-10-19 LAB
25(OH)D3 SERPL-MCNC: 25 NG/ML (ref 30–100)
ALBUMIN SERPL BCP-MCNC: 4.9 G/DL (ref 3.4–5)
ALP SERPL-CCNC: 94 U/L (ref 33–136)
ALT SERPL W P-5'-P-CCNC: 26 U/L (ref 7–45)
ANION GAP SERPL CALC-SCNC: 14 MMOL/L (ref 10–20)
APPEARANCE UR: ABNORMAL
AST SERPL W P-5'-P-CCNC: 24 U/L (ref 9–39)
BASOPHILS # BLD AUTO: 0.13 X10*3/UL (ref 0–0.1)
BASOPHILS NFR BLD AUTO: 1.5 %
BILIRUB SERPL-MCNC: 0.5 MG/DL (ref 0–1.2)
BILIRUB UR STRIP.AUTO-MCNC: NEGATIVE MG/DL
BUN SERPL-MCNC: 10 MG/DL (ref 6–23)
CALCIUM SERPL-MCNC: 9.6 MG/DL (ref 8.6–10.3)
CAOX CRY #/AREA UR COMP ASSIST: ABNORMAL /HPF
CHLORIDE SERPL-SCNC: 97 MMOL/L (ref 98–107)
CHOLEST SERPL-MCNC: 266 MG/DL (ref 0–199)
CHOLESTEROL/HDL RATIO: 3
CO2 SERPL-SCNC: 27 MMOL/L (ref 21–32)
COLOR UR: ABNORMAL
CREAT SERPL-MCNC: 0.73 MG/DL (ref 0.5–1.05)
EOSINOPHIL # BLD AUTO: 0.16 X10*3/UL (ref 0–0.7)
EOSINOPHIL NFR BLD AUTO: 1.8 %
ERYTHROCYTE [DISTWIDTH] IN BLOOD BY AUTOMATED COUNT: 12.4 % (ref 11.5–14.5)
GFR SERPL CREATININE-BSD FRML MDRD: >90 ML/MIN/1.73M*2
GLUCOSE SERPL-MCNC: 126 MG/DL (ref 74–99)
GLUCOSE UR STRIP.AUTO-MCNC: NEGATIVE MG/DL
HCT VFR BLD AUTO: 45.1 % (ref 36–46)
HDLC SERPL-MCNC: 87.9 MG/DL
HGB BLD-MCNC: 14.7 G/DL (ref 12–16)
IMM GRANULOCYTES # BLD AUTO: 0.03 X10*3/UL (ref 0–0.7)
IMM GRANULOCYTES NFR BLD AUTO: 0.3 % (ref 0–0.9)
KETONES UR STRIP.AUTO-MCNC: NEGATIVE MG/DL
LDLC SERPL CALC-MCNC: 159 MG/DL
LEUKOCYTE ESTERASE UR QL STRIP.AUTO: ABNORMAL
LYMPHOCYTES # BLD AUTO: 1.65 X10*3/UL (ref 1.2–4.8)
LYMPHOCYTES NFR BLD AUTO: 19 %
MCH RBC QN AUTO: 32.7 PG (ref 26–34)
MCHC RBC AUTO-ENTMCNC: 32.6 G/DL (ref 32–36)
MCV RBC AUTO: 100 FL (ref 80–100)
MONOCYTES # BLD AUTO: 0.51 X10*3/UL (ref 0.1–1)
MONOCYTES NFR BLD AUTO: 5.9 %
MUCOUS THREADS #/AREA URNS AUTO: ABNORMAL /LPF
NEUTROPHILS # BLD AUTO: 6.2 X10*3/UL (ref 1.2–7.7)
NEUTROPHILS NFR BLD AUTO: 71.5 %
NITRITE UR QL STRIP.AUTO: NEGATIVE
NON HDL CHOLESTEROL: 178 MG/DL (ref 0–149)
NRBC BLD-RTO: 0 /100 WBCS (ref 0–0)
PH UR STRIP.AUTO: 5 [PH]
PLATELET # BLD AUTO: 470 X10*3/UL (ref 150–450)
PMV BLD AUTO: 8.9 FL (ref 7.5–11.5)
POTASSIUM SERPL-SCNC: 3.9 MMOL/L (ref 3.5–5.3)
PROT SERPL-MCNC: 7.3 G/DL (ref 6.4–8.2)
PROT UR STRIP.AUTO-MCNC: NEGATIVE MG/DL
RBC # BLD AUTO: 4.49 X10*6/UL (ref 4–5.2)
RBC # UR STRIP.AUTO: ABNORMAL /UL
RBC #/AREA URNS AUTO: ABNORMAL /HPF
SODIUM SERPL-SCNC: 134 MMOL/L (ref 136–145)
SP GR UR STRIP.AUTO: 1.02
TRIGL SERPL-MCNC: 96 MG/DL (ref 0–149)
TSH SERPL-ACNC: 3.37 MIU/L (ref 0.44–3.98)
URATE CRY #/AREA UR COMP ASSIST: ABNORMAL /HPF
UROBILINOGEN UR STRIP.AUTO-MCNC: <2 MG/DL
VIT B12 SERPL-MCNC: 267 PG/ML (ref 211–911)
VLDL: 19 MG/DL (ref 0–40)
WBC # BLD AUTO: 8.7 X10*3/UL (ref 4.4–11.3)
WBC #/AREA URNS AUTO: ABNORMAL /HPF

## 2023-10-19 PROCEDURE — 80061 LIPID PANEL: CPT

## 2023-10-19 PROCEDURE — 76641 ULTRASOUND BREAST COMPLETE: CPT | Mod: 50

## 2023-10-19 PROCEDURE — 80053 COMPREHEN METABOLIC PANEL: CPT

## 2023-10-19 PROCEDURE — 85025 COMPLETE CBC W/AUTO DIFF WBC: CPT

## 2023-10-19 PROCEDURE — 76642 ULTRASOUND BREAST LIMITED: CPT | Mod: BILATERAL PROCEDURE | Performed by: RADIOLOGY

## 2023-10-19 PROCEDURE — 93923 UPR/LXTR ART STDY 3+ LVLS: CPT

## 2023-10-19 PROCEDURE — 36415 COLL VENOUS BLD VENIPUNCTURE: CPT

## 2023-10-19 PROCEDURE — 81001 URINALYSIS AUTO W/SCOPE: CPT

## 2023-10-19 PROCEDURE — 93923 UPR/LXTR ART STDY 3+ LVLS: CPT | Performed by: SURGERY

## 2023-10-19 PROCEDURE — 84443 ASSAY THYROID STIM HORMONE: CPT

## 2023-10-19 PROCEDURE — 82607 VITAMIN B-12: CPT

## 2023-10-19 PROCEDURE — 82306 VITAMIN D 25 HYDROXY: CPT

## 2023-11-02 PROBLEM — F32.A ANXIETY AND DEPRESSION: Status: RESOLVED | Noted: 2023-06-02 | Resolved: 2023-11-02

## 2023-11-02 PROBLEM — F41.9 ANXIETY AND DEPRESSION: Status: RESOLVED | Noted: 2023-06-02 | Resolved: 2023-11-02

## 2023-11-02 RX ORDER — BUDESONIDE AND FORMOTEROL FUMARATE DIHYDRATE 160; 4.5 UG/1; UG/1
2 AEROSOL RESPIRATORY (INHALATION) DAILY
COMMUNITY
End: 2024-05-22 | Stop reason: WASHOUT

## 2023-11-03 ENCOUNTER — HOSPITAL ENCOUNTER (OUTPATIENT)
Dept: NEUROLOGY | Facility: HOSPITAL | Age: 68
Discharge: HOME | End: 2023-11-03
Payer: MEDICARE

## 2023-11-03 PROCEDURE — 95886 MUSC TEST DONE W/N TEST COMP: CPT | Performed by: PSYCHIATRY & NEUROLOGY

## 2023-11-03 PROCEDURE — 95911 NRV CNDJ TEST 9-10 STUDIES: CPT | Performed by: PSYCHIATRY & NEUROLOGY

## 2023-11-06 ENCOUNTER — LAB (OUTPATIENT)
Dept: LAB | Facility: LAB | Age: 68
End: 2023-11-06
Payer: MEDICARE

## 2023-11-06 DIAGNOSIS — R73.01 ABNORMAL FASTING GLUCOSE: ICD-10-CM

## 2023-11-06 LAB
EST. AVERAGE GLUCOSE BLD GHB EST-MCNC: 111 MG/DL
HBA1C MFR BLD: 5.5 %

## 2023-11-06 PROCEDURE — 36415 COLL VENOUS BLD VENIPUNCTURE: CPT

## 2023-11-06 PROCEDURE — 83036 HEMOGLOBIN GLYCOSYLATED A1C: CPT

## 2023-11-17 ENCOUNTER — APPOINTMENT (OUTPATIENT)
Dept: VASCULAR MEDICINE | Facility: HOSPITAL | Age: 68
End: 2023-11-17
Payer: MEDICARE

## 2023-12-21 DIAGNOSIS — G62.89 OTHER POLYNEUROPATHY: ICD-10-CM

## 2023-12-21 RX ORDER — GABAPENTIN 300 MG/1
300 CAPSULE ORAL 3 TIMES DAILY
Qty: 90 CAPSULE | Refills: 5 | Status: SHIPPED | OUTPATIENT
Start: 2023-12-21 | End: 2024-06-18

## 2023-12-22 ENCOUNTER — TELEPHONE (OUTPATIENT)
Dept: PRIMARY CARE | Facility: CLINIC | Age: 68
End: 2023-12-22
Payer: MEDICARE

## 2023-12-22 NOTE — TELEPHONE ENCOUNTER
Pt was here in October for annual but had issue with itchy, swollen leg.  Said she had an infection but is still having itching & bleeding-due to scratching.  Is there anything to help  With the itching?  Or other suggestion?  Please advise  Facundo [phone]  No allergies to meds  ty

## 2024-02-23 DIAGNOSIS — I10 BENIGN ESSENTIAL HTN: ICD-10-CM

## 2024-02-24 RX ORDER — LOSARTAN POTASSIUM AND HYDROCHLOROTHIAZIDE 25; 100 MG/1; MG/1
1 TABLET ORAL DAILY
Qty: 100 TABLET | Refills: 2 | Status: SHIPPED | OUTPATIENT
Start: 2024-02-24

## 2024-02-24 RX ORDER — METOPROLOL SUCCINATE 50 MG/1
50 TABLET, EXTENDED RELEASE ORAL DAILY
Qty: 100 TABLET | Refills: 2 | Status: SHIPPED | OUTPATIENT
Start: 2024-02-24

## 2024-02-28 ENCOUNTER — TELEPHONE (OUTPATIENT)
Dept: PRIMARY CARE | Facility: CLINIC | Age: 69
End: 2024-02-28
Payer: MEDICARE

## 2024-02-28 NOTE — TELEPHONE ENCOUNTER
Pt was supposed to have cologuard test months ago & cannot do the test due to liquid stool  After gallbladder removal.  What would be her next step.  Referral?  Please advise  Ty

## 2024-03-02 DIAGNOSIS — G25.81 RESTLESS LEG SYNDROME: ICD-10-CM

## 2024-03-04 RX ORDER — PRAMIPEXOLE DIHYDROCHLORIDE 0.5 MG/1
TABLET ORAL
Qty: 180 TABLET | Refills: 3 | Status: SHIPPED | OUTPATIENT
Start: 2024-03-04

## 2024-05-20 DIAGNOSIS — J44.9 CHRONIC OBSTRUCTIVE PULMONARY DISEASE, UNSPECIFIED COPD TYPE (MULTI): ICD-10-CM

## 2024-05-21 RX ORDER — FLUTICASONE FUROATE, UMECLIDINIUM BROMIDE AND VILANTEROL TRIFENATATE 200; 62.5; 25 UG/1; UG/1; UG/1
POWDER RESPIRATORY (INHALATION)
Qty: 180 EACH | Refills: 3 | Status: SHIPPED | OUTPATIENT
Start: 2024-05-21

## 2024-05-22 ENCOUNTER — OFFICE VISIT (OUTPATIENT)
Dept: PRIMARY CARE | Facility: CLINIC | Age: 69
End: 2024-05-22
Payer: MEDICARE

## 2024-05-22 ENCOUNTER — LAB (OUTPATIENT)
Dept: LAB | Facility: LAB | Age: 69
End: 2024-05-22
Payer: MEDICARE

## 2024-05-22 ENCOUNTER — HOSPITAL ENCOUNTER (OUTPATIENT)
Dept: RADIOLOGY | Facility: EXTERNAL LOCATION | Age: 69
Discharge: HOME | End: 2024-05-22

## 2024-05-22 VITALS
OXYGEN SATURATION: 90 % | WEIGHT: 179 LBS | BODY MASS INDEX: 25.06 KG/M2 | DIASTOLIC BLOOD PRESSURE: 72 MMHG | HEIGHT: 71 IN | HEART RATE: 68 BPM | SYSTOLIC BLOOD PRESSURE: 124 MMHG

## 2024-05-22 DIAGNOSIS — E78.5 HYPERLIPIDEMIA, UNSPECIFIED HYPERLIPIDEMIA TYPE: ICD-10-CM

## 2024-05-22 DIAGNOSIS — R92.8 ABNORMAL MAMMOGRAM: Primary | ICD-10-CM

## 2024-05-22 DIAGNOSIS — R92.8 ABNORMAL MAMMOGRAM: ICD-10-CM

## 2024-05-22 DIAGNOSIS — Z12.11 ENCOUNTER FOR SCREENING FOR MALIGNANT NEOPLASM OF COLON: ICD-10-CM

## 2024-05-22 DIAGNOSIS — I87.2 VENOUS STASIS DERMATITIS OF BOTH LOWER EXTREMITIES: ICD-10-CM

## 2024-05-22 DIAGNOSIS — R73.9 HYPERGLYCEMIA: ICD-10-CM

## 2024-05-22 DIAGNOSIS — R59.0 AXILLARY LYMPHADENOPATHY: ICD-10-CM

## 2024-05-22 DIAGNOSIS — Z13.820 ENCOUNTER FOR OSTEOPOROSIS SCREENING IN ASYMPTOMATIC POSTMENOPAUSAL PATIENT: ICD-10-CM

## 2024-05-22 DIAGNOSIS — R60.9 PERIPHERAL EDEMA: ICD-10-CM

## 2024-05-22 DIAGNOSIS — Z78.0 ENCOUNTER FOR OSTEOPOROSIS SCREENING IN ASYMPTOMATIC POSTMENOPAUSAL PATIENT: ICD-10-CM

## 2024-05-22 DIAGNOSIS — I10 BENIGN ESSENTIAL HTN: ICD-10-CM

## 2024-05-22 DIAGNOSIS — J44.9 CHRONIC OBSTRUCTIVE PULMONARY DISEASE, UNSPECIFIED COPD TYPE (MULTI): ICD-10-CM

## 2024-05-22 LAB
ALBUMIN SERPL BCP-MCNC: 4.1 G/DL (ref 3.4–5)
ALP SERPL-CCNC: 81 U/L (ref 33–136)
ALT SERPL W P-5'-P-CCNC: 16 U/L (ref 7–45)
ANION GAP SERPL CALC-SCNC: 14 MMOL/L (ref 10–20)
AST SERPL W P-5'-P-CCNC: 20 U/L (ref 9–39)
BASOPHILS # BLD AUTO: 0.09 X10*3/UL (ref 0–0.1)
BASOPHILS NFR BLD AUTO: 1.1 %
BILIRUB SERPL-MCNC: 0.7 MG/DL (ref 0–1.2)
BUN SERPL-MCNC: 10 MG/DL (ref 6–23)
CALCIUM SERPL-MCNC: 9.8 MG/DL (ref 8.6–10.6)
CHLORIDE SERPL-SCNC: 99 MMOL/L (ref 98–107)
CHOLEST SERPL-MCNC: 211 MG/DL (ref 0–199)
CHOLESTEROL/HDL RATIO: 2.9
CO2 SERPL-SCNC: 28 MMOL/L (ref 21–32)
CREAT SERPL-MCNC: 0.65 MG/DL (ref 0.5–1.05)
EGFRCR SERPLBLD CKD-EPI 2021: >90 ML/MIN/1.73M*2
EOSINOPHIL # BLD AUTO: 0.23 X10*3/UL (ref 0–0.7)
EOSINOPHIL NFR BLD AUTO: 2.9 %
ERYTHROCYTE [DISTWIDTH] IN BLOOD BY AUTOMATED COUNT: 12.5 % (ref 11.5–14.5)
EST. AVERAGE GLUCOSE BLD GHB EST-MCNC: 111 MG/DL
GLUCOSE SERPL-MCNC: 98 MG/DL (ref 74–99)
HBA1C MFR BLD: 5.5 %
HCT VFR BLD AUTO: 44.3 % (ref 36–46)
HDLC SERPL-MCNC: 72 MG/DL
HGB BLD-MCNC: 14.7 G/DL (ref 12–16)
IMM GRANULOCYTES # BLD AUTO: 0.04 X10*3/UL (ref 0–0.7)
IMM GRANULOCYTES NFR BLD AUTO: 0.5 % (ref 0–0.9)
LDLC SERPL CALC-MCNC: 119 MG/DL
LYMPHOCYTES # BLD AUTO: 1.99 X10*3/UL (ref 1.2–4.8)
LYMPHOCYTES NFR BLD AUTO: 25 %
MCH RBC QN AUTO: 32.7 PG (ref 26–34)
MCHC RBC AUTO-ENTMCNC: 33.2 G/DL (ref 32–36)
MCV RBC AUTO: 98 FL (ref 80–100)
MONOCYTES # BLD AUTO: 0.48 X10*3/UL (ref 0.1–1)
MONOCYTES NFR BLD AUTO: 6 %
NEUTROPHILS # BLD AUTO: 5.13 X10*3/UL (ref 1.2–7.7)
NEUTROPHILS NFR BLD AUTO: 64.5 %
NON HDL CHOLESTEROL: 139 MG/DL (ref 0–149)
NRBC BLD-RTO: 0 /100 WBCS (ref 0–0)
PLATELET # BLD AUTO: 412 X10*3/UL (ref 150–450)
POTASSIUM SERPL-SCNC: 3.8 MMOL/L (ref 3.5–5.3)
PROT SERPL-MCNC: 6.8 G/DL (ref 6.4–8.2)
RBC # BLD AUTO: 4.5 X10*6/UL (ref 4–5.2)
SODIUM SERPL-SCNC: 137 MMOL/L (ref 136–145)
TRIGL SERPL-MCNC: 98 MG/DL (ref 0–149)
TSH SERPL-ACNC: 1.74 MIU/L (ref 0.44–3.98)
VLDL: 20 MG/DL (ref 0–40)
WBC # BLD AUTO: 8 X10*3/UL (ref 4.4–11.3)

## 2024-05-22 PROCEDURE — 85025 COMPLETE CBC W/AUTO DIFF WBC: CPT

## 2024-05-22 PROCEDURE — 1036F TOBACCO NON-USER: CPT | Performed by: STUDENT IN AN ORGANIZED HEALTH CARE EDUCATION/TRAINING PROGRAM

## 2024-05-22 PROCEDURE — 80053 COMPREHEN METABOLIC PANEL: CPT

## 2024-05-22 PROCEDURE — 3074F SYST BP LT 130 MM HG: CPT | Performed by: STUDENT IN AN ORGANIZED HEALTH CARE EDUCATION/TRAINING PROGRAM

## 2024-05-22 PROCEDURE — 1160F RVW MEDS BY RX/DR IN RCRD: CPT | Performed by: STUDENT IN AN ORGANIZED HEALTH CARE EDUCATION/TRAINING PROGRAM

## 2024-05-22 PROCEDURE — 36415 COLL VENOUS BLD VENIPUNCTURE: CPT

## 2024-05-22 PROCEDURE — G2211 COMPLEX E/M VISIT ADD ON: HCPCS | Performed by: STUDENT IN AN ORGANIZED HEALTH CARE EDUCATION/TRAINING PROGRAM

## 2024-05-22 PROCEDURE — 80061 LIPID PANEL: CPT

## 2024-05-22 PROCEDURE — 84443 ASSAY THYROID STIM HORMONE: CPT

## 2024-05-22 PROCEDURE — 99214 OFFICE O/P EST MOD 30 MIN: CPT | Performed by: STUDENT IN AN ORGANIZED HEALTH CARE EDUCATION/TRAINING PROGRAM

## 2024-05-22 PROCEDURE — 83036 HEMOGLOBIN GLYCOSYLATED A1C: CPT

## 2024-05-22 PROCEDURE — 1159F MED LIST DOCD IN RCRD: CPT | Performed by: STUDENT IN AN ORGANIZED HEALTH CARE EDUCATION/TRAINING PROGRAM

## 2024-05-22 PROCEDURE — 3078F DIAST BP <80 MM HG: CPT | Performed by: STUDENT IN AN ORGANIZED HEALTH CARE EDUCATION/TRAINING PROGRAM

## 2024-05-22 RX ORDER — FUROSEMIDE 20 MG/1
20 TABLET ORAL DAILY PRN
Qty: 45 TABLET | Refills: 3 | Status: SHIPPED | OUTPATIENT
Start: 2024-05-22 | End: 2025-05-22

## 2024-05-23 ENCOUNTER — TELEPHONE (OUTPATIENT)
Dept: PRIMARY CARE | Facility: CLINIC | Age: 69
End: 2024-05-23
Payer: MEDICARE

## 2024-05-23 NOTE — TELEPHONE ENCOUNTER
----- Message from Anabela Beckham MD sent at 5/23/2024  9:28 AM EDT -----  Labs look good. Cholesterol is improved from prior draw. No need to start cholesterol medication with current levels. Advise low fat, low carb diet and exercise 3x weekly as tolerated.

## 2024-07-13 DIAGNOSIS — G62.89 OTHER POLYNEUROPATHY: ICD-10-CM

## 2024-07-15 RX ORDER — GABAPENTIN 300 MG/1
300 CAPSULE ORAL 3 TIMES DAILY
Qty: 240 CAPSULE | Refills: 3 | Status: SHIPPED | OUTPATIENT
Start: 2024-07-15

## 2024-07-18 ENCOUNTER — HOSPITAL ENCOUNTER (OUTPATIENT)
Dept: RADIOLOGY | Facility: CLINIC | Age: 69
Discharge: HOME | End: 2024-07-18
Payer: MEDICARE

## 2024-07-18 DIAGNOSIS — R92.8 OTHER ABNORMAL AND INCONCLUSIVE FINDINGS ON DIAGNOSTIC IMAGING OF BREAST: ICD-10-CM

## 2024-07-18 DIAGNOSIS — R92.8 ABNORMAL MAMMOGRAM: ICD-10-CM

## 2024-07-18 DIAGNOSIS — R59.0 LOCALIZED ENLARGED LYMPH NODES: ICD-10-CM

## 2024-07-18 DIAGNOSIS — R59.0 AXILLARY LYMPHADENOPATHY: ICD-10-CM

## 2024-07-23 DIAGNOSIS — R92.8 ABNORMAL MAMMOGRAM: ICD-10-CM

## 2024-07-23 DIAGNOSIS — R59.0 AXILLARY LYMPHADENOPATHY: ICD-10-CM

## 2024-07-23 NOTE — PROGRESS NOTES
New order needed placed for breast ultrasound. Previous order was showing it was complete even though it was never completed. Order was needed for patient to schedule follow up mammogram.

## 2024-07-24 ENCOUNTER — TELEPHONE (OUTPATIENT)
Dept: PRIMARY CARE | Facility: CLINIC | Age: 69
End: 2024-07-24
Payer: MEDICARE

## 2024-07-24 NOTE — TELEPHONE ENCOUNTER
Copied from CRM #[id number]. Topic: Information Request - Trying to reach PCP  >> Jul 23, 2024 11:01 AM Kitty BENTLEY wrote:  This patient needs a new breast US order to help her get scheduled for her Diagnostic Mamm. I am not sure how the ultrasound order got cancelled.        Ordered

## 2024-08-16 ENCOUNTER — PATIENT OUTREACH (OUTPATIENT)
Dept: CARE COORDINATION | Facility: CLINIC | Age: 69
End: 2024-08-16
Payer: MEDICARE

## 2024-08-16 RX ORDER — AZITHROMYCIN 500 MG/1
500 TABLET, FILM COATED ORAL DAILY
COMMUNITY

## 2024-08-16 RX ORDER — PREDNISONE 10 MG/1
10 TABLET ORAL DAILY
COMMUNITY

## 2024-08-16 RX ORDER — BENZONATATE 100 MG/1
100 CAPSULE ORAL 3 TIMES DAILY PRN
COMMUNITY

## 2024-08-16 NOTE — PROGRESS NOTES
Discharge Facility:[facility]   Discharge Diagnosis:  Severe sepsis   Respiratory failure with hypoxia   Pneumonia   COPD with acute exacerbation     Admission Date:8/13/2024  Discharge Date: 8/15/2024    PCP Appointment Date:TBD  Specialist Appointment Date: Pulmonary/Lucius TBD   Hospital Encounter and Summary Linked: Yes  See discharge assessment below for further details  Engagement  Call Start Time: 1544 (8/16/2024  4:35 PM)    Medications  Medications reviewed with patient/caregiver?: Yes (8/16/2024  4:35 PM)  Is the patient having any side effects they believe may be caused by any medication additions or changes?: No (8/16/2024  4:35 PM)  Does the patient have all medications ordered at discharge?: Yes (8/16/2024  4:35 PM)  Care Management Interventions: No intervention needed (8/16/2024  4:35 PM)  Prescription Comments: -- (Azithromycin 500 mg one qd M,W,F , Lasix 20 one qd, Mucinex -30 XR one bid, Neurontin 300 mg one tid , Prednisone 10 mg 4 x 4 days, 3 x 4 days, 2 x 4 days, 1x4 days, Tessalon Perles 100 mg one tid prn) (8/16/2024  4:35 PM)  Is the patient taking all medications as directed (includes completed medication regime)?: Yes (8/16/2024  4:35 PM)    Appointments  Does the patient have a primary care provider?: Yes (8/16/2024  4:35 PM)  Care Management Interventions: -- (Will follow up with Pulmonary first Dr Kan) (8/16/2024  4:35 PM)  Has the patient kept scheduled appointments due by today?: Yes (8/16/2024  4:35 PM)    Self Management  What is the home health agency?: -- (N/A) (8/16/2024  4:35 PM)  What Durable Medical Equipment (DME) was ordered?: -- (N/A) (8/16/2024  4:35 PM)    Patient Teaching  Does the patient have access to their discharge instructions?: Yes (8/16/2024  4:35 PM)  What is the patient's perception of their health status since discharge?: Improving (8/16/2024  4:35 PM)  Is the patient/caregiver able to teach back the hierarchy of who to call/visit for  symptoms/problems? PCP, Specialist, Home Health nurse, Urgent Care, ED, 911: Yes (8/16/2024  4:35 PM)    Wrap Up  Wrap Up Additional Comments: -- (Kassy is feeling better, she monitors PO2 level, was 87-88% while up and moving and 93% at rest. She was told to keep O2 at 2 L at rest and move to 5 L with acitivity. She will fu with Pulmonology and report any concerns to provider.) (8/16/2024  4:35 PM)

## 2024-08-23 ENCOUNTER — TELEPHONE (OUTPATIENT)
Dept: PRIMARY CARE | Facility: CLINIC | Age: 69
End: 2024-08-23

## 2024-08-23 NOTE — TELEPHONE ENCOUNTER
Pt called stating they have an order for a colonoscopy, but scheduling stated she could not get in for another year. She is wondering how to move forward.

## 2024-08-28 ENCOUNTER — APPOINTMENT (OUTPATIENT)
Dept: VASCULAR SURGERY | Facility: CLINIC | Age: 69
End: 2024-08-28
Payer: MEDICARE

## 2024-08-28 VITALS — BODY MASS INDEX: 22.54 KG/M2 | HEART RATE: 74 BPM | OXYGEN SATURATION: 91 % | WEIGHT: 161 LBS | HEIGHT: 71 IN

## 2024-08-28 DIAGNOSIS — I87.2 VENOUS STASIS DERMATITIS OF BOTH LOWER EXTREMITIES: ICD-10-CM

## 2024-08-28 DIAGNOSIS — M79.89 LEG SWELLING: ICD-10-CM

## 2024-08-28 PROCEDURE — 1160F RVW MEDS BY RX/DR IN RCRD: CPT | Performed by: SURGERY

## 2024-08-28 PROCEDURE — 99204 OFFICE O/P NEW MOD 45 MIN: CPT | Performed by: SURGERY

## 2024-08-28 PROCEDURE — 1036F TOBACCO NON-USER: CPT | Performed by: SURGERY

## 2024-08-28 PROCEDURE — 3008F BODY MASS INDEX DOCD: CPT | Performed by: SURGERY

## 2024-08-28 PROCEDURE — 1159F MED LIST DOCD IN RCRD: CPT | Performed by: SURGERY

## 2024-08-28 NOTE — PROGRESS NOTES
"Kassy Lockett is a 69 y.o. female     Subjective   This patient  presents today for a new consult for evaluation of lower extremity swelling right greater than left.  She is currently 69 years old.  She quit smoking in 2020.  She has been diagnosed with COPD.  She has lost 30 pounds over the last few months unintentionally.  She does have a history of cervical cancer quite a long time ago.  She is 5 foot 1161 pounds.  She is not a diabetic.  She denies any fever chills nausea vomiting or headache.  She does state that her right leg swelling seems to be worse than her left but seems better today also.  She states that the swelling does not go down after a night sleep in bed.         Objective     Vitals:    08/28/24 0900   Pulse: 74   SpO2: 91%      Physical Exam  This patient is alert and oriented x3 her head is normocephalic neck is soft and supple without palpable lymph nodes.  Heart is regular rate.  Lungs are clear.  Abdomen soft with positive bowel sounds.  There is no pain on palpation.  Upper and lower extremities have adequate range of motion with palpable brachial radial femoral and popliteal pulses.  Skin turgor is adequate.  Psychologically the patient appears to be acting appropriately.  She does have some pitting edema involving her right lower leg involving her ankle and mid to distal tibia.  She does have palpable brachial radial femoral popliteal and pedal pulses.  There is some redness also.  There are signs of vasculitis in this area also  Pulse 74, height 1.803 m (5' 11\"), weight 73 kg (161 lb), SpO2 91%.            Patient Active Problem List    Diagnosis Date Noted    HTN (hypertension) 07/26/2023    Benign essential HTN 06/02/2023    COPD (chronic obstructive pulmonary disease) (Multi) 06/02/2023    Exertional shortness of breath 06/02/2023    Restless leg syndrome 06/02/2023    Bilateral pseudophakia 04/30/2019          Current Outpatient Medications:     albuterol 90 mcg/actuation inhaler, USE " 1 TO 2 INHALATIONS BY  MOUTH EVERY 4 TO 6 HOURS AS NEEDED, Disp: 25.5 g, Rfl: 6    azithromycin (Zithromax) 500 mg tablet, Take 1 tablet (500 mg) by mouth once daily., Disp: , Rfl:     benzonatate (Tessalon) 100 mg capsule, Take 1 capsule (100 mg) by mouth 3 times a day as needed for cough. Do not crush or chew., Disp: , Rfl:     Comp-Air Nebulizer Compressor device, use as directed, Disp: , Rfl:     dextromethorphan-guaifenesin (Mucinex DM)  mg 12 hr tablet, Take 1 tablet by mouth every 12 hours. Do not crush, chew, or split., Disp: , Rfl:     furosemide (Lasix) 20 mg tablet, Take 1 tablet (20 mg) by mouth once daily as needed (edema)., Disp: 45 tablet, Rfl: 3    gabapentin (Neurontin) 300 mg capsule, TAKE 1 CAPSULE BY MOUTH 3 TIMES  DAILY, Disp: 240 capsule, Rfl: 3    ipratropium-albuteroL (Duo-Neb) 0.5-2.5 mg/3 mL nebulizer solution, Take 3 mL by nebulization 4 times a day as needed for wheezing or shortness of breath., Disp: 360 mL, Rfl: 1    losartan-hydrochlorothiazide (Hyzaar) 100-25 mg tablet, TAKE 1 TABLET BY MOUTH ONCE  DAILY, Disp: 100 tablet, Rfl: 2    metoprolol succinate XL (Toprol-XL) 50 mg 24 hr tablet, TAKE 1 TABLET BY MOUTH ONCE  DAILY, Disp: 100 tablet, Rfl: 2    pramipexole (Mirapex) 0.5 mg tablet, TAKE 1 OR 2 TABLETS BY MOUTH AT  BEDTIME FOR RESTLESS LEGS, Disp: 180 tablet, Rfl: 3    predniSONE (Deltasone) 10 mg tablet, Take 1 tablet (10 mg) by mouth once daily. 4x4 days, 3 x4 days, 2x4 days, 1x4 days, Disp: , Rfl:     Trelegy Ellipta 200-62.5-25 mcg blister with device, INHALE 1 INHALATION BY MOUTH  ONCE DAILY, Disp: 180 each, Rfl: 3     Lab Results   Component Value Date    WBC 8.0 05/22/2024    HGB 14.7 05/22/2024    HCT 44.3 05/22/2024     05/22/2024    CHOL 211 (H) 05/22/2024    TRIG 98 05/22/2024    HDL 72.0 05/22/2024    ALT 16 05/22/2024    AST 20 05/22/2024     05/22/2024    K 3.8 05/22/2024    CL 99 05/22/2024    CREATININE 0.65 05/22/2024    BUN 10 05/22/2024    CO2  28 05/22/2024    TSH 1.74 05/22/2024    HGBA1C 5.5 05/22/2024       No results found.              Assessment/Plan   Active Problems:  There are no active Hospital Problems.      This patient presents today  visit and consult for evaluation of bilateral lower extremity swelling right greater than left.  She does have a history of cervical cancer many many years ago.  She quit smoking in 2020.  She does have COPD and has had a 30 pound weight loss that was not on purpose.  Her pulmonologist is somewhat concerned.  She also states that her feet are numb.  She does have palpable popliteal and pedal pulses.  She has pitting edema involving her right lower leg and ankle area.  At this time, I would like to order a venous duplex scan with reflux  Study.  Her arterial status is normal.  I would like her to follow-up after her testing is done.  She needs to elevate her lower legs above her heart when resting.  She needs to increase activity and start an exercise program for her lower extremities.  Thank you very much for allow me to take part in the care of your patient.  Sincerely years, Dr. Jensen Mercado, DO

## 2024-08-30 ENCOUNTER — PATIENT OUTREACH (OUTPATIENT)
Dept: PRIMARY CARE | Facility: CLINIC | Age: 69
End: 2024-08-30
Payer: MEDICARE

## 2024-08-30 NOTE — PROGRESS NOTES
Call after hospitalization.  At time of outreach call the patient feels as if their condition has improved since last visit.  Kassy is feeling better down to 2L on O2.

## 2024-09-19 ENCOUNTER — HOSPITAL ENCOUNTER (OUTPATIENT)
Dept: RADIOLOGY | Facility: CLINIC | Age: 69
Discharge: HOME | End: 2024-09-19
Payer: MEDICARE

## 2024-09-19 DIAGNOSIS — R92.8 ABNORMAL MAMMOGRAM: ICD-10-CM

## 2024-09-19 DIAGNOSIS — R59.0 AXILLARY LYMPHADENOPATHY: ICD-10-CM

## 2024-09-19 PROCEDURE — 76981 USE PARENCHYMA: CPT | Mod: 50

## 2024-09-19 PROCEDURE — 76642 ULTRASOUND BREAST LIMITED: CPT | Mod: 50

## 2024-09-19 PROCEDURE — 76642 ULTRASOUND BREAST LIMITED: CPT | Performed by: RADIOLOGY

## 2024-09-19 PROCEDURE — 77062 BREAST TOMOSYNTHESIS BI: CPT

## 2024-09-19 PROCEDURE — G0279 TOMOSYNTHESIS, MAMMO: HCPCS | Performed by: RADIOLOGY

## 2024-09-19 PROCEDURE — 77066 DX MAMMO INCL CAD BI: CPT | Performed by: RADIOLOGY

## 2024-09-23 ENCOUNTER — TELEPHONE (OUTPATIENT)
Dept: PRIMARY CARE | Facility: CLINIC | Age: 69
End: 2024-09-23
Payer: MEDICARE

## 2024-09-23 NOTE — TELEPHONE ENCOUNTER
----- Message from Anaebla Beckham sent at 9/20/2024  4:15 PM EDT -----  Repeat in 1 year  Has likely benign L breast hypoechoic nodule and L axillary tail node.

## 2024-10-08 ENCOUNTER — APPOINTMENT (OUTPATIENT)
Dept: PRIMARY CARE | Facility: CLINIC | Age: 69
End: 2024-10-08
Payer: MEDICARE

## 2024-10-08 ENCOUNTER — LAB (OUTPATIENT)
Dept: LAB | Facility: LAB | Age: 69
End: 2024-10-08
Payer: MEDICARE

## 2024-10-08 ENCOUNTER — HOSPITAL ENCOUNTER (OUTPATIENT)
Dept: RADIOLOGY | Facility: EXTERNAL LOCATION | Age: 69
Discharge: HOME | End: 2024-10-08

## 2024-10-08 VITALS
WEIGHT: 161 LBS | SYSTOLIC BLOOD PRESSURE: 130 MMHG | HEIGHT: 71 IN | DIASTOLIC BLOOD PRESSURE: 76 MMHG | BODY MASS INDEX: 22.54 KG/M2 | OXYGEN SATURATION: 96 % | HEART RATE: 65 BPM

## 2024-10-08 DIAGNOSIS — E55.9 VITAMIN D INSUFFICIENCY: ICD-10-CM

## 2024-10-08 DIAGNOSIS — Z00.00 MEDICARE ANNUAL WELLNESS VISIT, SUBSEQUENT: Primary | ICD-10-CM

## 2024-10-08 DIAGNOSIS — Z13.820 ENCOUNTER FOR OSTEOPOROSIS SCREENING IN ASYMPTOMATIC POSTMENOPAUSAL PATIENT: ICD-10-CM

## 2024-10-08 DIAGNOSIS — I10 BENIGN ESSENTIAL HTN: ICD-10-CM

## 2024-10-08 DIAGNOSIS — G47.00 INSOMNIA, UNSPECIFIED TYPE: ICD-10-CM

## 2024-10-08 DIAGNOSIS — J44.9 CHRONIC OBSTRUCTIVE PULMONARY DISEASE, UNSPECIFIED COPD TYPE (MULTI): ICD-10-CM

## 2024-10-08 DIAGNOSIS — Z78.0 ENCOUNTER FOR OSTEOPOROSIS SCREENING IN ASYMPTOMATIC POSTMENOPAUSAL PATIENT: ICD-10-CM

## 2024-10-08 DIAGNOSIS — Z23 ENCOUNTER FOR IMMUNIZATION: ICD-10-CM

## 2024-10-08 DIAGNOSIS — Z00.00 ANNUAL PHYSICAL EXAM: ICD-10-CM

## 2024-10-08 DIAGNOSIS — I25.10 ASCVD (ARTERIOSCLEROTIC CARDIOVASCULAR DISEASE): ICD-10-CM

## 2024-10-08 DIAGNOSIS — Z12.11 COLON CANCER SCREENING: ICD-10-CM

## 2024-10-08 LAB
25(OH)D3 SERPL-MCNC: 45 NG/ML (ref 30–100)
ALBUMIN SERPL BCP-MCNC: 4.2 G/DL (ref 3.4–5)
ALP SERPL-CCNC: 84 U/L (ref 33–136)
ALT SERPL W P-5'-P-CCNC: 11 U/L (ref 7–45)
ANION GAP SERPL CALC-SCNC: 12 MMOL/L (ref 10–20)
AST SERPL W P-5'-P-CCNC: 13 U/L (ref 9–39)
BASOPHILS # BLD AUTO: 0.09 X10*3/UL (ref 0–0.1)
BASOPHILS NFR BLD AUTO: 1.3 %
BILIRUB SERPL-MCNC: 0.7 MG/DL (ref 0–1.2)
BUN SERPL-MCNC: 8 MG/DL (ref 6–23)
CALCIUM SERPL-MCNC: 9.7 MG/DL (ref 8.6–10.6)
CHLORIDE SERPL-SCNC: 102 MMOL/L (ref 98–107)
CHOLEST SERPL-MCNC: 205 MG/DL (ref 0–199)
CHOLESTEROL/HDL RATIO: 3.1
CO2 SERPL-SCNC: 28 MMOL/L (ref 21–32)
CREAT SERPL-MCNC: 0.56 MG/DL (ref 0.5–1.05)
EGFRCR SERPLBLD CKD-EPI 2021: >90 ML/MIN/1.73M*2
EOSINOPHIL # BLD AUTO: 0.11 X10*3/UL (ref 0–0.7)
EOSINOPHIL NFR BLD AUTO: 1.5 %
ERYTHROCYTE [DISTWIDTH] IN BLOOD BY AUTOMATED COUNT: 13.2 % (ref 11.5–14.5)
GLUCOSE SERPL-MCNC: 109 MG/DL (ref 74–99)
HCT VFR BLD AUTO: 46.3 % (ref 36–46)
HDLC SERPL-MCNC: 66 MG/DL
HGB BLD-MCNC: 15.1 G/DL (ref 12–16)
IMM GRANULOCYTES # BLD AUTO: 0.02 X10*3/UL (ref 0–0.7)
IMM GRANULOCYTES NFR BLD AUTO: 0.3 % (ref 0–0.9)
LDLC SERPL CALC-MCNC: 125 MG/DL
LYMPHOCYTES # BLD AUTO: 1.6 X10*3/UL (ref 1.2–4.8)
LYMPHOCYTES NFR BLD AUTO: 22.3 %
MCH RBC QN AUTO: 32.7 PG (ref 26–34)
MCHC RBC AUTO-ENTMCNC: 32.6 G/DL (ref 32–36)
MCV RBC AUTO: 100 FL (ref 80–100)
MONOCYTES # BLD AUTO: 0.39 X10*3/UL (ref 0.1–1)
MONOCYTES NFR BLD AUTO: 5.4 %
NEUTROPHILS # BLD AUTO: 4.98 X10*3/UL (ref 1.2–7.7)
NEUTROPHILS NFR BLD AUTO: 69.2 %
NON HDL CHOLESTEROL: 139 MG/DL (ref 0–149)
NRBC BLD-RTO: 0 /100 WBCS (ref 0–0)
PLATELET # BLD AUTO: 404 X10*3/UL (ref 150–450)
POTASSIUM SERPL-SCNC: 3.9 MMOL/L (ref 3.5–5.3)
PROT SERPL-MCNC: 6.3 G/DL (ref 6.4–8.2)
RBC # BLD AUTO: 4.62 X10*6/UL (ref 4–5.2)
SODIUM SERPL-SCNC: 138 MMOL/L (ref 136–145)
TRIGL SERPL-MCNC: 70 MG/DL (ref 0–149)
TSH SERPL-ACNC: 1.24 MIU/L (ref 0.44–3.98)
VLDL: 14 MG/DL (ref 0–40)
WBC # BLD AUTO: 7.2 X10*3/UL (ref 4.4–11.3)

## 2024-10-08 PROCEDURE — 90662 IIV NO PRSV INCREASED AG IM: CPT | Performed by: STUDENT IN AN ORGANIZED HEALTH CARE EDUCATION/TRAINING PROGRAM

## 2024-10-08 PROCEDURE — 1036F TOBACCO NON-USER: CPT | Performed by: STUDENT IN AN ORGANIZED HEALTH CARE EDUCATION/TRAINING PROGRAM

## 2024-10-08 PROCEDURE — 1160F RVW MEDS BY RX/DR IN RCRD: CPT | Performed by: STUDENT IN AN ORGANIZED HEALTH CARE EDUCATION/TRAINING PROGRAM

## 2024-10-08 PROCEDURE — 1170F FXNL STATUS ASSESSED: CPT | Performed by: STUDENT IN AN ORGANIZED HEALTH CARE EDUCATION/TRAINING PROGRAM

## 2024-10-08 PROCEDURE — 3008F BODY MASS INDEX DOCD: CPT | Performed by: STUDENT IN AN ORGANIZED HEALTH CARE EDUCATION/TRAINING PROGRAM

## 2024-10-08 PROCEDURE — G0008 ADMIN INFLUENZA VIRUS VAC: HCPCS | Performed by: STUDENT IN AN ORGANIZED HEALTH CARE EDUCATION/TRAINING PROGRAM

## 2024-10-08 PROCEDURE — 99214 OFFICE O/P EST MOD 30 MIN: CPT | Performed by: STUDENT IN AN ORGANIZED HEALTH CARE EDUCATION/TRAINING PROGRAM

## 2024-10-08 PROCEDURE — 82306 VITAMIN D 25 HYDROXY: CPT

## 2024-10-08 PROCEDURE — G0439 PPPS, SUBSEQ VISIT: HCPCS | Performed by: STUDENT IN AN ORGANIZED HEALTH CARE EDUCATION/TRAINING PROGRAM

## 2024-10-08 PROCEDURE — 99397 PER PM REEVAL EST PAT 65+ YR: CPT | Performed by: STUDENT IN AN ORGANIZED HEALTH CARE EDUCATION/TRAINING PROGRAM

## 2024-10-08 PROCEDURE — 84443 ASSAY THYROID STIM HORMONE: CPT

## 2024-10-08 PROCEDURE — 36415 COLL VENOUS BLD VENIPUNCTURE: CPT

## 2024-10-08 PROCEDURE — 99497 ADVNCD CARE PLAN 30 MIN: CPT | Performed by: STUDENT IN AN ORGANIZED HEALTH CARE EDUCATION/TRAINING PROGRAM

## 2024-10-08 PROCEDURE — 85025 COMPLETE CBC W/AUTO DIFF WBC: CPT

## 2024-10-08 PROCEDURE — 80061 LIPID PANEL: CPT

## 2024-10-08 PROCEDURE — 3078F DIAST BP <80 MM HG: CPT | Performed by: STUDENT IN AN ORGANIZED HEALTH CARE EDUCATION/TRAINING PROGRAM

## 2024-10-08 PROCEDURE — 1123F ACP DISCUSS/DSCN MKR DOCD: CPT | Performed by: STUDENT IN AN ORGANIZED HEALTH CARE EDUCATION/TRAINING PROGRAM

## 2024-10-08 PROCEDURE — 1159F MED LIST DOCD IN RCRD: CPT | Performed by: STUDENT IN AN ORGANIZED HEALTH CARE EDUCATION/TRAINING PROGRAM

## 2024-10-08 PROCEDURE — 3075F SYST BP GE 130 - 139MM HG: CPT | Performed by: STUDENT IN AN ORGANIZED HEALTH CARE EDUCATION/TRAINING PROGRAM

## 2024-10-08 PROCEDURE — G0446 INTENS BEHAVE THER CARDIO DX: HCPCS | Performed by: STUDENT IN AN ORGANIZED HEALTH CARE EDUCATION/TRAINING PROGRAM

## 2024-10-08 PROCEDURE — 80053 COMPREHEN METABOLIC PANEL: CPT

## 2024-10-08 RX ORDER — TRAZODONE HYDROCHLORIDE 50 MG/1
50 TABLET ORAL NIGHTLY PRN
Qty: 30 TABLET | Refills: 5 | Status: SHIPPED | OUTPATIENT
Start: 2024-10-08 | End: 2025-10-08

## 2024-10-08 RX ORDER — ATORVASTATIN CALCIUM 40 MG/1
40 TABLET, FILM COATED ORAL DAILY
Qty: 100 TABLET | Refills: 3 | Status: SHIPPED | OUTPATIENT
Start: 2024-10-08 | End: 2025-11-12

## 2024-10-08 ASSESSMENT — ACTIVITIES OF DAILY LIVING (ADL)
BATHING: INDEPENDENT
MANAGING_FINANCES: INDEPENDENT
DRESSING: INDEPENDENT
DOING_HOUSEWORK: INDEPENDENT
TAKING_MEDICATION: INDEPENDENT
GROCERY_SHOPPING: INDEPENDENT

## 2024-10-08 ASSESSMENT — ENCOUNTER SYMPTOMS
OCCASIONAL FEELINGS OF UNSTEADINESS: 0
LOSS OF SENSATION IN FEET: 0

## 2024-10-08 ASSESSMENT — PATIENT HEALTH QUESTIONNAIRE - PHQ9
SUM OF ALL RESPONSES TO PHQ9 QUESTIONS 1 AND 2: 0
1. LITTLE INTEREST OR PLEASURE IN DOING THINGS: NOT AT ALL
2. FEELING DOWN, DEPRESSED OR HOPELESS: NOT AT ALL

## 2024-10-08 NOTE — PROGRESS NOTES
Subjective   Patient ID: Kassy Lockett is a 69 y.o. female who presents for the following    Assessment/Plan   Preventative Medicine (Done in Oct 2023)  Medicare Wellness Needs to be done. Will do at next visit.   -UTD on vaccination. Flu shot today   -Colon ca screening: had cologuard ordered in October 2023 but did not do. Has never had colonoscopy. Colonoscopy ordered at last visit but patient was unable to schedule through . States she was given appointments 1 year out. Will send to [facility].   -Lung Ca screening in Feb 2024 shows 3mm nodule. Repeat in 1 year.   -MMG and Breast US in Sept 2024: probably benign L hypoechoic nodule and L axillary tail node. BIRADS 3. Repeat in 1 year  -DEXA scan ordered at last visit, but she did not complete. Order given again today   -ACP discussed. Has a living will/advanced directive but has her  listed as HCPOA.  has passed away and patient needs to change her HCPOA. Currently will be using her sister in law as HCPOA. Code Status discussed, but patient is currently not sure about DNR vs Full Code. Will remain full code and will put her stipulations in Advanced Directive. > 16 minutes on Advanced Care Planning.       HTN  -BP is well controlled on current meds  -Losartan/hydrochlorothiazide, metoprolol  -Low salt/low carb diet recommended    COPD  -Continue Trelegy  -Continue PRN Albuterol   -Follows with pulm (Dr Levi)     RLS  -Continue pramipexole     Vitamin D Insufficiency  -Vitamin D level is 25  -Continue multivitamins with Vitamin D; check Vitamin D levels today     HLD   ASCVD Risk  -ASCVD 8.1%  PLAN  -Start Lipitor 40mg PO daily rx   -Side effects discussed and patient understands.   -Cardiovascular risk counseling administered (15 min)   -Low fat, low carb diet advised  -Increase exercise as tolerated in the setting of COPD     Insomnia  -Decrease caffeine intake. Currently drinking 2 pots of coffee daily and 2 Pepsi's daily.   -Sleep hygiene  -Start  trazodone 50mg PO at bedtime PRN     CVS with Dermatitis  -Has vascular reflux study this Friday  -Continue follow up with Dr Mercado  -Elevate legs at night  -Try compression stockings or compression wrapping.     HPI  69-year-old female past medical history of COPD, hypertension, restless leg syndrome presents for AMW/Preventative medicine/Acute sick visit.     She was hospitalized in August 2024 for COPD exacerbation.     Currently doing well. Diet is well balanced and intact. Does not exercise much but stays active.     C/O insomnia and only sleeps 2-3 hours per night. She drips 2 pots of coffee daily and as well as 2 Pepsi sodas. Extensive counseling provided on decreasing caffeine intact, but patient states that if she cuts down on caffeine her sleep is disrupted even more. Discussed sleep hygiene and medication options. Will try trazodone 50mg PO at bedtime PRN.     ASCVD risk discussed. Willl start statin. Side effect profile discussed.     Continue to follow with vascular surgery for BLLE varicosities. Is not wearing compression stockings.     States that she has chronic shortness of breath and is following up with pulmonary medicine.  Currently well-controlled on Trelegy.  SpO2 in office is about 96% on room air.  Not on home O2.    Has yet to get colonoscopy due to  scheduling issues.   Has not scheduled DEXA scan yet.     Patient had lung cancer screening done earlier this year which showed 3 mm right lower lobe nodule.  Following with pulmonary medicine.  Repeat low-dose CT in 1 year.    Denies fevers, chills, weight loss, lightheadedness, dizziness, vision changes, sore throat, runny nose, CP,  cough, palpitations, n/v, abd pain, black/bloody stools, arthralgias, or new numbness/weakness/tingling in arms/legs/face.      PMH: COPD, Neuropathy, HTN, RLS   Surgeries: Cervical cancer with cervix removal, GB removal     Fhx: significant cancer hx. Mother had lung ca, one sister with myelofibrosis, one  "sister with lung ca    Personal Hx  -  -1 child       Social Hx  T: quit 4 years ago  A: denies  D: denies         Visit Vitals  /76   Pulse 65   Ht 1.803 m (5' 11\")   Wt 73 kg (161 lb)   SpO2 96%   BMI 22.45 kg/m²   OB Status Postmenopausal   Smoking Status Former   BSA 1.91 m²     PHYSICAL EXAM   Physical Exam     Visit Vitals  /76   Pulse 65   Ht 1.803 m (5' 11\")   Wt 73 kg (161 lb)   SpO2 96%   BMI 22.45 kg/m²   OB Status Postmenopausal   Smoking Status Former   BSA 1.91 m²        General: NAD. NCAT. Aox3   HEENT: PERRLA. EOMI. MMM. Nares patent bl.  Cardiovascular: RRR. No MRG. S1/S2 wnl.   Respiratory: BL scattered rhonchi. No acute resp distress   GI: Soft, NT abdomen.   MSK: ROM x 4. CTLS non-tender   Extremities: Bilateral lower extremity chronic venous stasis right greater than left.  Trace bilateral lower extremity pitting edema.  Cap refill < 2 sec.   Skin: Bilateral lower extremity venous stasis dermatitis right greater than left.  No open ulcerations or cellulitis seen.  Neuro: Aox3. Cranial Nerves grossly intact. Motor/sensory wnl.   Psych: Mood wnl.     REVIEW OF SYSTEMS   ROS in HPI    No Known Allergies    Current Outpatient Medications   Medication Sig Dispense Refill    albuterol 90 mcg/actuation inhaler USE 1 TO 2 INHALATIONS BY  MOUTH EVERY 4 TO 6 HOURS AS NEEDED 25.5 g 6    Comp-Air Nebulizer Compressor device use as directed      furosemide (Lasix) 20 mg tablet Take 1 tablet (20 mg) by mouth once daily as needed (edema). 45 tablet 3    gabapentin (Neurontin) 300 mg capsule TAKE 1 CAPSULE BY MOUTH 3 TIMES  DAILY 240 capsule 3    losartan-hydrochlorothiazide (Hyzaar) 100-25 mg tablet TAKE 1 TABLET BY MOUTH ONCE  DAILY 100 tablet 2    metoprolol succinate XL (Toprol-XL) 50 mg 24 hr tablet TAKE 1 TABLET BY MOUTH ONCE  DAILY 100 tablet 2    pramipexole (Mirapex) 0.5 mg tablet TAKE 1 OR 2 TABLETS BY MOUTH AT  BEDTIME FOR RESTLESS LEGS 180 tablet 3    Trelegy Ellipta 200-62.5-25 " mcg blister with device INHALE 1 INHALATION BY MOUTH  ONCE DAILY 180 each 3    benzonatate (Tessalon) 100 mg capsule Take 1 capsule (100 mg) by mouth 3 times a day as needed for cough. Do not crush or chew.      ipratropium-albuteroL (Duo-Neb) 0.5-2.5 mg/3 mL nebulizer solution Take 3 mL by nebulization 4 times a day as needed for wheezing or shortness of breath. 360 mL 1    predniSONE (Deltasone) 10 mg tablet Take 1 tablet (10 mg) by mouth once daily. 4x4 days, 3 x4 days, 2x4 days, 1x4 days       No current facility-administered medications for this visit.       Objective     No visits with results within 4 Month(s) from this visit.   Latest known visit with results is:   Lab on 05/22/2024   Component Date Value Ref Range Status    WBC 05/22/2024 8.0  4.4 - 11.3 x10*3/uL Final    nRBC 05/22/2024 0.0  0.0 - 0.0 /100 WBCs Final    RBC 05/22/2024 4.50  4.00 - 5.20 x10*6/uL Final    Hemoglobin 05/22/2024 14.7  12.0 - 16.0 g/dL Final    Hematocrit 05/22/2024 44.3  36.0 - 46.0 % Final    MCV 05/22/2024 98  80 - 100 fL Final    MCH 05/22/2024 32.7  26.0 - 34.0 pg Final    MCHC 05/22/2024 33.2  32.0 - 36.0 g/dL Final    RDW 05/22/2024 12.5  11.5 - 14.5 % Final    Platelets 05/22/2024 412  150 - 450 x10*3/uL Final    Neutrophils % 05/22/2024 64.5  40.0 - 80.0 % Final    Immature Granulocytes %, Automated 05/22/2024 0.5  0.0 - 0.9 % Final    Lymphocytes % 05/22/2024 25.0  13.0 - 44.0 % Final    Monocytes % 05/22/2024 6.0  2.0 - 10.0 % Final    Eosinophils % 05/22/2024 2.9  0.0 - 6.0 % Final    Basophils % 05/22/2024 1.1  0.0 - 2.0 % Final    Neutrophils Absolute 05/22/2024 5.13  1.20 - 7.70 x10*3/uL Final    Immature Granulocytes Absolute, Au* 05/22/2024 0.04  0.00 - 0.70 x10*3/uL Final    Lymphocytes Absolute 05/22/2024 1.99  1.20 - 4.80 x10*3/uL Final    Monocytes Absolute 05/22/2024 0.48  0.10 - 1.00 x10*3/uL Final    Eosinophils Absolute 05/22/2024 0.23  0.00 - 0.70 x10*3/uL Final    Basophils Absolute 05/22/2024 0.09   0.00 - 0.10 x10*3/uL Final    Glucose 05/22/2024 98  74 - 99 mg/dL Final    Sodium 05/22/2024 137  136 - 145 mmol/L Final    Potassium 05/22/2024 3.8  3.5 - 5.3 mmol/L Final    Chloride 05/22/2024 99  98 - 107 mmol/L Final    Bicarbonate 05/22/2024 28  21 - 32 mmol/L Final    Anion Gap 05/22/2024 14  10 - 20 mmol/L Final    Urea Nitrogen 05/22/2024 10  6 - 23 mg/dL Final    Creatinine 05/22/2024 0.65  0.50 - 1.05 mg/dL Final    eGFR 05/22/2024 >90  >60 mL/min/1.73m*2 Final    Calcium 05/22/2024 9.8  8.6 - 10.6 mg/dL Final    Albumin 05/22/2024 4.1  3.4 - 5.0 g/dL Final    Alkaline Phosphatase 05/22/2024 81  33 - 136 U/L Final    Total Protein 05/22/2024 6.8  6.4 - 8.2 g/dL Final    AST 05/22/2024 20  9 - 39 U/L Final    Bilirubin, Total 05/22/2024 0.7  0.0 - 1.2 mg/dL Final    ALT 05/22/2024 16  7 - 45 U/L Final    Thyroid Stimulating Hormone 05/22/2024 1.74  0.44 - 3.98 mIU/L Final    Hemoglobin A1C 05/22/2024 5.5  see below % Final    Estimated Average Glucose 05/22/2024 111  Not Established mg/dL Final    Cholesterol 05/22/2024 211 (H)  0 - 199 mg/dL Final    HDL-Cholesterol 05/22/2024 72.0  mg/dL Final    Cholesterol/HDL Ratio 05/22/2024 2.9   Final    LDL Calculated 05/22/2024 119 (H)  <=99 mg/dL Final    VLDL 05/22/2024 20  0 - 40 mg/dL Final    Triglycerides 05/22/2024 98  0 - 149 mg/dL Final    Non HDL Cholesterol 05/22/2024 139  0 - 149 mg/dL Final       Radiology: Reviewed imaging in powerchart.  No results found.    Family History   Problem Relation Name Age of Onset    COPD Mother Sylvie     Lung cancer Mother Sylvie     Breast cancer Mother Sylvie     Heart attack Father Gopal     Heart disease Father Gopal     Cancer Sister      Cancer Sister Kelley     Cancer Sister Anju     COPD Brother Star     Diabetes Mother's Sister Kita     Breast cancer Mother's Sister Kita     Breast cancer Mother's Sister      Diabetes Mother's Brother Sebastian     Diabetes Mother's Brother Gregory      Social History      Socioeconomic History    Marital status:    Tobacco Use    Smoking status: Former     Current packs/day: 0.00     Types: Cigarettes     Quit date: 2020     Years since quittin.4    Smokeless tobacco: Never    Tobacco comments:     I smoked for 50 years and quit 3 years ago.   Substance and Sexual Activity    Alcohol use: Yes     Comment: I drink beer maybe once a month.    Drug use: Never    Sexual activity: Not Currently     Partners: Male     Birth control/protection: None     Social Determinants of Health     Financial Resource Strain: Low Risk  (10/24/2022)    Received from [facility]    Overall Financial Resource Strain (CARDIA)     Difficulty of Paying Living Expenses: Not hard at all   Food Insecurity: No Food Insecurity (10/24/2022)    Received from [facility]    Hunger Vital Sign     Worried About Running Out of Food in the Last Year: Never true     Ran Out of Food in the Last Year: Never true   Transportation Needs: No Transportation Needs (10/24/2022)    Received from [facility]    PRAPARE - Transportation     Lack of Transportation (Medical): No     Lack of Transportation (Non-Medical): No   Physical Activity: Insufficiently Active (10/24/2022)    Received from [facility]    Exercise Vital Sign     Days of Exercise per Week: 2 days     Minutes of Exercise per Session: 40 min   Stress: No Stress Concern Present (10/24/2022)    Received from [facility]    [employer] - Occupational Stress Questionnaire     Feeling of Stress : Not at all   Social Connections: Moderately Isolated (10/24/2022)    Received from [facility]    Social Connection and Isolation Panel [NHANES]     Frequency of Communication with Friends and Family: More than three times a week     Frequency of Social Gatherings with Friends and Family: Three times a week     Attends [employer] Services: 1 to 4 times per  year     Active Member of Clubs or Organizations: No     Attends Club or Organization Meetings: Never     Marital Status:    Intimate Partner Violence: Not At Risk (10/24/2022)    Received from [facility]    Humiliation, Afraid, Rape, and Kick questionnaire     Fear of Current or Ex-Partner: No     Emotionally Abused: No     Physically Abused: No     Sexually Abused: No   Housing Stability: Low Risk  (10/24/2022)    Received from [facility]    Housing Stability Vital Sign     Unable to Pay for Housing in the Last Year: No     Number of Places Lived in the Last Year: 2     Unstable Housing in the Last Year: No     Past Medical History:   Diagnosis Date    Malignant neoplasm of cervix uteri, unspecified     Primary cervical cancer    Personal history of other diseases of the respiratory system 02/21/2019    History of bronchitis     Past Surgical History:   Procedure Laterality Date    CHOLECYSTECTOMY  01/22/2018    Cholecystectomy    OTHER SURGICAL HISTORY  08/05/2016    Cervical Surgery (Gyn)       Charting was completed using voice recognition technology and may include unintended errors.

## 2024-10-11 ENCOUNTER — HOSPITAL ENCOUNTER (OUTPATIENT)
Dept: VASCULAR MEDICINE | Facility: CLINIC | Age: 69
Discharge: HOME | End: 2024-10-11
Payer: MEDICARE

## 2024-10-11 DIAGNOSIS — R60.0 LOCALIZED EDEMA: ICD-10-CM

## 2024-10-11 DIAGNOSIS — M79.89 LEG SWELLING: ICD-10-CM

## 2024-10-11 PROCEDURE — 93970 EXTREMITY STUDY: CPT | Performed by: INTERNAL MEDICINE

## 2024-10-11 PROCEDURE — 93970 EXTREMITY STUDY: CPT

## 2024-10-16 ENCOUNTER — PATIENT OUTREACH (OUTPATIENT)
Dept: PRIMARY CARE | Facility: CLINIC | Age: 69
End: 2024-10-16
Payer: MEDICARE

## 2024-10-16 NOTE — PROGRESS NOTES
Call after hospitalization.  At time of outreach call the patient feels as if their condition has improved since last visit.  Reviewed the PCP appointment with the pt and addressed any questions or concerns.  Kassy is doing well, no concerns.

## 2024-10-18 ENCOUNTER — TELEPHONE (OUTPATIENT)
Dept: PRIMARY CARE | Facility: CLINIC | Age: 69
End: 2024-10-18
Payer: MEDICARE

## 2024-10-18 NOTE — TELEPHONE ENCOUNTER
----- Message from Anabela Beckham sent at 10/18/2024  7:54 AM EDT -----  Has osteoporosis.   Please set up virtual visit to discuss treatment options.

## 2024-10-23 ENCOUNTER — APPOINTMENT (OUTPATIENT)
Dept: PRIMARY CARE | Facility: CLINIC | Age: 69
End: 2024-10-23
Payer: MEDICARE

## 2024-10-23 DIAGNOSIS — M81.0 OSTEOPOROSIS WITHOUT CURRENT PATHOLOGICAL FRACTURE, UNSPECIFIED OSTEOPOROSIS TYPE: Primary | ICD-10-CM

## 2024-10-23 DIAGNOSIS — Z12.11 COLON CANCER SCREENING: ICD-10-CM

## 2024-10-23 PROCEDURE — 1036F TOBACCO NON-USER: CPT | Performed by: STUDENT IN AN ORGANIZED HEALTH CARE EDUCATION/TRAINING PROGRAM

## 2024-10-23 PROCEDURE — 99442 PR PHYS/QHP TELEPHONE EVALUATION 11-20 MIN: CPT | Performed by: STUDENT IN AN ORGANIZED HEALTH CARE EDUCATION/TRAINING PROGRAM

## 2024-10-23 PROCEDURE — 1160F RVW MEDS BY RX/DR IN RCRD: CPT | Performed by: STUDENT IN AN ORGANIZED HEALTH CARE EDUCATION/TRAINING PROGRAM

## 2024-10-23 PROCEDURE — 1123F ACP DISCUSS/DSCN MKR DOCD: CPT | Performed by: STUDENT IN AN ORGANIZED HEALTH CARE EDUCATION/TRAINING PROGRAM

## 2024-10-23 PROCEDURE — 1159F MED LIST DOCD IN RCRD: CPT | Performed by: STUDENT IN AN ORGANIZED HEALTH CARE EDUCATION/TRAINING PROGRAM

## 2024-10-23 RX ORDER — ALENDRONATE SODIUM 70 MG/1
70 TABLET ORAL
Qty: 4 TABLET | Refills: 11 | Status: SHIPPED | OUTPATIENT
Start: 2024-10-23 | End: 2025-10-23

## 2024-10-23 NOTE — PROGRESS NOTES
Subjective   Patient ID: Kassy Lockett is a 69 y.o. female who presents for the following    Assessment/Plan   Preventative Medicine (Done in Oct 2024)  Medicare Wellness October 2024  -UTD on vaccination. Flu shot done in 2024  -Colon ca screening: has appointment with Dr Rucker in Nov 2024  -Lung Ca screening in Feb 2024 shows 3mm nodule. Repeat in 1 year.   -MMG and Breast US in Sept 2024: probably benign L hypoechoic nodule and L axillary tail node. BIRADS 3. Repeat in 1 year  -DEXA scan completed in Oct 2024  -ACP discussed. Has a living will/advanced directive but has her  listed as HCPOA.  has passed away and patient needs to change her HCPOA. Currently will be using her sister in law as HCPOA. Code Status discussed, but patient is currently not sure about DNR vs Full Code. Will remain full code and will put her stipulations in Advanced Directive.     Osteoporosis   -Seen on DEXA scan from October 2024  -Calcium: 9.7, Vitamin D 45   PLAN  -Start fosamax 70mg PO weekly   -Side effect profile discussed. Risk of Jaw osteonecrosis, Atypical fx, esophagitis discussed.     HTN  -BP is well controlled on current meds  -Losartan/hydrochlorothiazide, metoprolol  -Low salt/low carb diet recommended    COPD  -Continue Trelegy  -Continue PRN Albuterol   -Follows with pulm (Dr Levi)     RLS  -Continue pramipexole     Vitamin D Insufficiency - resolved  -Vitamin D level is 45  -Continue multivitamins with Vitamin D    HLD   ASCVD Risk  -ASCVD 8.1%  PLAN  -Continue Lipitor 40mg PO daily rx   -Side effects discussed and patient understands.   -Low fat, low carb diet advised  -Increase exercise as tolerated in the setting of COPD     Insomnia  -Decrease caffeine intake. Currently drinking 2 pots of coffee daily and 2 Pepsi's daily.   -Sleep hygiene  -Continue trazodone 50mg PO at bedtime PRN     CVS with Dermatitis  -Continue follow up with Dr Mercado  -Elevate legs at night  -Try compression stockings or  compression wrapping.       Approximately 17 minutes spent on  encounter     HPI  Virtual or Telephone Consent    A telephone visit (audio only) between the patient (at the originating site) and the provider (at the distant site) was utilized to provide this telehealth service.   Verbal consent was requested and obtained from Kassy Lockett on this date, 10/23/24 for a telehealth visit.     69-year-old female past medical history of COPD, hypertension, restless leg syndrome presents for followup.     Recently had labs done that show osteoporosis.   Discussed management with fosamax. Patient is agreeable.   No new complaints or concerns.   Side effect profile discussed with patient.     GI follow up scheduled for Nov 2024 for colonoscopy.   Tolerating all home medications appropriately.       Denies fevers, chills, weight loss, lightheadedness, dizziness, vision changes, sore throat, runny nose, CP,  cough, palpitations, n/v, abd pain, black/bloody stools, arthralgias, or new numbness/weakness/tingling in arms/legs/face.      PMH: COPD, Neuropathy, HTN, RLS   Surgeries: Cervical cancer with cervix removal, GB removal     Fhx: significant cancer hx. Mother had lung ca, one sister with myelofibrosis, one sister with lung ca    Personal Hx  -  -1 child       Social Hx  T: quit 4 years ago  A: denies  D: denies         Visit Vitals  OB Status Postmenopausal   Smoking Status Former     PHYSICAL EXAM   Physical Exam     Visit Vitals  OB Status Postmenopausal   Smoking Status Former        General: NAD. NCAT. Aox3   HEENT: PERRLA. EOMI. MMM. Nares patent bl.  Cardiovascular: RRR. No MRG. S1/S2 wnl.   Respiratory: BL scattered rhonchi. No acute resp distress   GI: Soft, NT abdomen.   MSK: ROM x 4. CTLS non-tender   Extremities: Bilateral lower extremity chronic venous stasis right greater than left.  Trace bilateral lower extremity pitting edema.  Cap refill < 2 sec.   Skin: Bilateral lower extremity venous stasis  dermatitis right greater than left.  No open ulcerations or cellulitis seen.  Neuro: Aox3. Cranial Nerves grossly intact. Motor/sensory wnl.   Psych: Mood wnl.     REVIEW OF SYSTEMS   ROS in HPI    No Known Allergies    Current Outpatient Medications   Medication Sig Dispense Refill    albuterol 90 mcg/actuation inhaler USE 1 TO 2 INHALATIONS BY  MOUTH EVERY 4 TO 6 HOURS AS NEEDED 25.5 g 6    atorvastatin (Lipitor) 40 mg tablet Take 1 tablet (40 mg) by mouth once daily. 100 tablet 3    Comp-Air Nebulizer Compressor device use as directed      furosemide (Lasix) 20 mg tablet Take 1 tablet (20 mg) by mouth once daily as needed (edema). 45 tablet 3    gabapentin (Neurontin) 300 mg capsule TAKE 1 CAPSULE BY MOUTH 3 TIMES  DAILY 240 capsule 3    ipratropium-albuteroL (Duo-Neb) 0.5-2.5 mg/3 mL nebulizer solution Take 3 mL by nebulization 4 times a day as needed for wheezing or shortness of breath. 360 mL 1    losartan-hydrochlorothiazide (Hyzaar) 100-25 mg tablet TAKE 1 TABLET BY MOUTH ONCE  DAILY 100 tablet 2    metoprolol succinate XL (Toprol-XL) 50 mg 24 hr tablet TAKE 1 TABLET BY MOUTH ONCE  DAILY 100 tablet 2    pramipexole (Mirapex) 0.5 mg tablet TAKE 1 OR 2 TABLETS BY MOUTH AT  BEDTIME FOR RESTLESS LEGS 180 tablet 3    traZODone (Desyrel) 50 mg tablet Take 1 tablet (50 mg) by mouth as needed at bedtime for sleep. 30 tablet 5    Trelegy Ellipta 200-62.5-25 mcg blister with device INHALE 1 INHALATION BY MOUTH  ONCE DAILY 180 each 3     No current facility-administered medications for this visit.       Objective     Lab on 10/08/2024   Component Date Value Ref Range Status    WBC 10/08/2024 7.2  4.4 - 11.3 x10*3/uL Final    nRBC 10/08/2024 0.0  0.0 - 0.0 /100 WBCs Final    RBC 10/08/2024 4.62  4.00 - 5.20 x10*6/uL Final    Hemoglobin 10/08/2024 15.1  12.0 - 16.0 g/dL Final    Hematocrit 10/08/2024 46.3 (H)  36.0 - 46.0 % Final    MCV 10/08/2024 100  80 - 100 fL Final    MCH 10/08/2024 32.7  26.0 - 34.0 pg Final     MCHC 10/08/2024 32.6  32.0 - 36.0 g/dL Final    RDW 10/08/2024 13.2  11.5 - 14.5 % Final    Platelets 10/08/2024 404  150 - 450 x10*3/uL Final    Neutrophils % 10/08/2024 69.2  40.0 - 80.0 % Final    Immature Granulocytes %, Automated 10/08/2024 0.3  0.0 - 0.9 % Final    Lymphocytes % 10/08/2024 22.3  13.0 - 44.0 % Final    Monocytes % 10/08/2024 5.4  2.0 - 10.0 % Final    Eosinophils % 10/08/2024 1.5  0.0 - 6.0 % Final    Basophils % 10/08/2024 1.3  0.0 - 2.0 % Final    Neutrophils Absolute 10/08/2024 4.98  1.20 - 7.70 x10*3/uL Final    Immature Granulocytes Absolute, Au* 10/08/2024 0.02  0.00 - 0.70 x10*3/uL Final    Lymphocytes Absolute 10/08/2024 1.60  1.20 - 4.80 x10*3/uL Final    Monocytes Absolute 10/08/2024 0.39  0.10 - 1.00 x10*3/uL Final    Eosinophils Absolute 10/08/2024 0.11  0.00 - 0.70 x10*3/uL Final    Basophils Absolute 10/08/2024 0.09  0.00 - 0.10 x10*3/uL Final    Glucose 10/08/2024 109 (H)  74 - 99 mg/dL Final    Sodium 10/08/2024 138  136 - 145 mmol/L Final    Potassium 10/08/2024 3.9  3.5 - 5.3 mmol/L Final    Chloride 10/08/2024 102  98 - 107 mmol/L Final    Bicarbonate 10/08/2024 28  21 - 32 mmol/L Final    Anion Gap 10/08/2024 12  10 - 20 mmol/L Final    Urea Nitrogen 10/08/2024 8  6 - 23 mg/dL Final    Creatinine 10/08/2024 0.56  0.50 - 1.05 mg/dL Final    eGFR 10/08/2024 >90  >60 mL/min/1.73m*2 Final    Calcium 10/08/2024 9.7  8.6 - 10.6 mg/dL Final    Albumin 10/08/2024 4.2  3.4 - 5.0 g/dL Final    Alkaline Phosphatase 10/08/2024 84  33 - 136 U/L Final    Total Protein 10/08/2024 6.3 (L)  6.4 - 8.2 g/dL Final    AST 10/08/2024 13  9 - 39 U/L Final    Bilirubin, Total 10/08/2024 0.7  0.0 - 1.2 mg/dL Final    ALT 10/08/2024 11  7 - 45 U/L Final    Cholesterol 10/08/2024 205 (H)  0 - 199 mg/dL Final    HDL-Cholesterol 10/08/2024 66.0  mg/dL Final    Cholesterol/HDL Ratio 10/08/2024 3.1   Final    LDL Calculated 10/08/2024 125 (H)  <=99 mg/dL Final    VLDL 10/08/2024 14  0 - 40 mg/dL Final     Triglycerides 10/08/2024 70  0 - 149 mg/dL Final    Non HDL Cholesterol 10/08/2024 139  0 - 149 mg/dL Final    Thyroid Stimulating Hormone 10/08/2024 1.24  0.44 - 3.98 mIU/L Final    Vitamin D, 25-Hydroxy, Total 10/08/2024 45  30 - 100 ng/mL Final       Radiology: Reviewed imaging in powerchart.  No results found.    Family History   Problem Relation Name Age of Onset    COPD Mother Sylvie     Lung cancer Mother Sylvie     Breast cancer Mother Sylvie     Heart attack Father Gopal     Heart disease Father Gopal     Cancer Sister      Cancer Sister Kelley     Cancer Sister Anju     COPD Brother Star     Diabetes Mother's Sister Kita     Breast cancer Mother's Sister Kita     Breast cancer Mother's Sister      Diabetes Mother's Brother Sebastain     Diabetes Mother's Brother Gregory      Social History     Socioeconomic History    Marital status:    Tobacco Use    Smoking status: Former     Current packs/day: 0.00     Types: Cigarettes     Quit date: 2020     Years since quittin.4    Smokeless tobacco: Never    Tobacco comments:     I smoked for 50 years and quit 3 years ago.   Substance and Sexual Activity    Alcohol use: Yes     Comment: I drink beer maybe once a month.    Drug use: Never    Sexual activity: Not Currently     Partners: Male     Birth control/protection: None     Social Drivers of Health     Financial Resource Strain: Low Risk  (10/24/2022)    Received from [employer]    Overall Financial Resource Strain (CARDIA)     Difficulty of Paying Living Expenses: Not hard at all   Food Insecurity: No Food Insecurity (10/24/2022)    Received from [employer]    Hunger Vital Sign     Worried About Running Out of Food in the Last Year: Never true     Ran Out of Food in the Last Year: Never true   Transportation Needs: No Transportation Needs (10/24/2022)    Received from [employer]    PRAPARE - Transportation     Lack of Transportation (Medical): No      Lack of Transportation (Non-Medical): No   Physical Activity: Insufficiently Active (10/24/2022)    Received from [facility]    Exercise Vital Sign     Days of Exercise per Week: 2 days     Minutes of Exercise per Session: 40 min   Stress: No Stress Concern Present (10/24/2022)    Received from [facility]    [employer] [place] [employer] - Occupational Stress Questionnaire     Feeling of Stress : Not at all   Social Connections: Moderately Isolated (10/24/2022)    Received from [facility]    Social Connection and Isolation Panel [NHANES]     Frequency of Communication with Friends and Family: More than three times a week     Frequency of Social Gatherings with Friends and Family: Three times a week     Attends Jainism Services: 1 to 4 times per year     Active Member of Clubs or Organizations: No     Attends Club or Organization Meetings: Never     Marital Status:    Intimate Partner Violence: Not At Risk (10/24/2022)    Received from [facility]    Humiliation, Afraid, Rape, and Kick questionnaire     Fear of Current or Ex-Partner: No     Emotionally Abused: No     Physically Abused: No     Sexually Abused: No   Housing Stability: Low Risk  (10/24/2022)    Received from [facility]    Housing Stability Vital Sign     Unable to Pay for Housing in the Last Year: No     Number of Places Lived in the Last Year: 2     Unstable Housing in the Last Year: No     Past Medical History:   Diagnosis Date    Malignant neoplasm of cervix uteri, unspecified     Primary cervical cancer    Personal history of other diseases of the respiratory system 02/21/2019    History of bronchitis     Past Surgical History:   Procedure Laterality Date    CHOLECYSTECTOMY  01/22/2018    Cholecystectomy    OTHER SURGICAL HISTORY  08/05/2016    Cervical Surgery (Gyn)       Charting was completed using voice recognition technology and may include unintended  errors.

## 2024-11-07 ENCOUNTER — APPOINTMENT (OUTPATIENT)
Dept: VASCULAR SURGERY | Facility: CLINIC | Age: 69
End: 2024-11-07
Payer: MEDICARE

## 2024-11-07 ENCOUNTER — PREP FOR PROCEDURE (OUTPATIENT)
Dept: VASCULAR SURGERY | Facility: HOSPITAL | Age: 69
End: 2024-11-07

## 2024-11-07 VITALS
BODY MASS INDEX: 21.56 KG/M2 | HEIGHT: 71 IN | HEART RATE: 72 BPM | WEIGHT: 154 LBS | DIASTOLIC BLOOD PRESSURE: 68 MMHG | SYSTOLIC BLOOD PRESSURE: 120 MMHG | OXYGEN SATURATION: 96 %

## 2024-11-07 DIAGNOSIS — I87.2 VENOUS STASIS DERMATITIS OF BOTH LOWER EXTREMITIES: Primary | ICD-10-CM

## 2024-11-07 DIAGNOSIS — M79.606 PAIN AND SWELLING OF LOWER EXTREMITY, UNSPECIFIED LATERALITY: ICD-10-CM

## 2024-11-07 DIAGNOSIS — M79.89 LEG SWELLING: ICD-10-CM

## 2024-11-07 DIAGNOSIS — M79.89 PAIN AND SWELLING OF LOWER EXTREMITY, UNSPECIFIED LATERALITY: ICD-10-CM

## 2024-11-07 DIAGNOSIS — I87.2 CHRONIC VENOUS INSUFFICIENCY OF LOWER EXTREMITY: Primary | ICD-10-CM

## 2024-11-07 PROCEDURE — 3078F DIAST BP <80 MM HG: CPT | Performed by: SURGERY

## 2024-11-07 PROCEDURE — 99214 OFFICE O/P EST MOD 30 MIN: CPT | Performed by: SURGERY

## 2024-11-07 PROCEDURE — 3074F SYST BP LT 130 MM HG: CPT | Performed by: SURGERY

## 2024-11-07 PROCEDURE — 1123F ACP DISCUSS/DSCN MKR DOCD: CPT | Performed by: SURGERY

## 2024-11-07 PROCEDURE — 1036F TOBACCO NON-USER: CPT | Performed by: SURGERY

## 2024-11-07 PROCEDURE — 1160F RVW MEDS BY RX/DR IN RCRD: CPT | Performed by: SURGERY

## 2024-11-07 PROCEDURE — 3008F BODY MASS INDEX DOCD: CPT | Performed by: SURGERY

## 2024-11-07 PROCEDURE — 1159F MED LIST DOCD IN RCRD: CPT | Performed by: SURGERY

## 2024-11-13 ENCOUNTER — PATIENT OUTREACH (OUTPATIENT)
Dept: PRIMARY CARE | Facility: CLINIC | Age: 69
End: 2024-11-13
Payer: MEDICARE

## 2024-11-13 NOTE — PROGRESS NOTES
Call after hospitalization.  At time of outreach call the patient feels as if their condition has returned to baseline since last visit.  Reviewed the PCP appointment with the pt and addressed any questions or concerns.  Kassy is doing pretty good, no concerns at this time.

## 2024-12-03 DIAGNOSIS — G25.81 RESTLESS LEG SYNDROME: ICD-10-CM

## 2024-12-03 RX ORDER — PRAMIPEXOLE DIHYDROCHLORIDE 0.5 MG/1
0.5 TABLET ORAL 2 TIMES DAILY
Qty: 180 TABLET | Refills: 3 | Status: SHIPPED | OUTPATIENT
Start: 2024-12-03

## 2024-12-04 ENCOUNTER — PRE-ADMISSION TESTING (OUTPATIENT)
Dept: PREADMISSION TESTING | Facility: HOSPITAL | Age: 69
End: 2024-12-04
Payer: MEDICARE

## 2024-12-04 VITALS
HEIGHT: 71 IN | WEIGHT: 152.78 LBS | OXYGEN SATURATION: 98 % | HEART RATE: 62 BPM | BODY MASS INDEX: 21.39 KG/M2 | SYSTOLIC BLOOD PRESSURE: 149 MMHG | DIASTOLIC BLOOD PRESSURE: 79 MMHG | RESPIRATION RATE: 18 BRPM | TEMPERATURE: 96.8 F

## 2024-12-04 DIAGNOSIS — Z01.818 PREOP TESTING: Primary | ICD-10-CM

## 2024-12-04 DIAGNOSIS — J42 CHRONIC BRONCHITIS, UNSPECIFIED CHRONIC BRONCHITIS TYPE (MULTI): ICD-10-CM

## 2024-12-04 DIAGNOSIS — I87.2 VENOUS (PERIPHERAL) INSUFFICIENCY: ICD-10-CM

## 2024-12-04 PROCEDURE — 87081 CULTURE SCREEN ONLY: CPT | Mod: PARLAB

## 2024-12-04 PROCEDURE — 99204 OFFICE O/P NEW MOD 45 MIN: CPT | Performed by: NURSE PRACTITIONER

## 2024-12-04 RX ORDER — CHLORHEXIDINE GLUCONATE ORAL RINSE 1.2 MG/ML
15 SOLUTION DENTAL DAILY
Qty: 30 ML | Refills: 0 | Status: SHIPPED | OUTPATIENT
Start: 2024-12-04 | End: 2024-12-06

## 2024-12-04 RX ORDER — AZITHROMYCIN 500 MG/1
250 TABLET, FILM COATED ORAL 3 TIMES WEEKLY
COMMUNITY
Start: 2024-11-14

## 2024-12-04 RX ORDER — CHLORHEXIDINE GLUCONATE 40 MG/ML
SOLUTION TOPICAL DAILY
Qty: 118 ML | Refills: 0 | Status: SHIPPED | OUTPATIENT
Start: 2024-12-04 | End: 2024-12-09

## 2024-12-04 ASSESSMENT — DUKE ACTIVITY SCORE INDEX (DASI)
CAN YOU PARTICIPATE IN STRENOUS SPORTS LIKE SWIMMING, SINGLES TENNIS, FOOTBALL, BASKETBALL, OR SKIING: NO
CAN YOU CLIMB A FLIGHT OF STAIRS OR WALK UP A HILL: YES
CAN YOU WALK A BLOCK OR TWO ON LEVEL GROUND: YES
CAN YOU DO HEAVY WORK AROUND THE HOUSE LIKE SCRUBBING FLOORS OR LIFTING AND MOVING HEAVY FURNITURE: YES
CAN YOU WALK INDOORS, SUCH AS AROUND YOUR HOUSE: YES
TOTAL_SCORE: 45.45
CAN YOU DO LIGHT WORK AROUND THE HOUSE LIKE DUSTING OR WASHING DISHES: YES
CAN YOU TAKE CARE OF YOURSELF (EAT, DRESS, BATHE, OR USE TOILET): YES
DASI METS SCORE: 8.3
CAN YOU DO YARD WORK LIKE RAKING LEAVES, WEEDING OR PUSHING A MOWER: YES
CAN YOU DO MODERATE WORK AROUND THE HOUSE LIKE VACUUMING, SWEEPING FLOORS OR CARRYING GROCERIES: YES
CAN YOU PARTICIPATE IN MODERATE RECREATIONAL ACTIVITIES LIKE GOLF, BOWLING, DANCING, DOUBLES TENNIS OR THROWING A BASEBALL OR FOOTBALL: YES
CAN YOU RUN A SHORT DISTANCE: YES
CAN YOU HAVE SEXUAL RELATIONS: NO

## 2024-12-04 ASSESSMENT — PAIN - FUNCTIONAL ASSESSMENT: PAIN_FUNCTIONAL_ASSESSMENT: 0-10

## 2024-12-04 ASSESSMENT — PAIN SCALES - GENERAL: PAINLEVEL_OUTOF10: 4

## 2024-12-04 NOTE — PREPROCEDURE INSTRUCTIONS
Medication List            Accurate as of December 4, 2024 11:28 AM. Always use your most recent med list.                albuterol 90 mcg/actuation inhaler  USE 1 TO 2 INHALATIONS BY  MOUTH EVERY 4 TO 6 HOURS AS NEEDED  Medication Adjustments for Surgery: Take/Use as prescribed     alendronate 70 mg tablet  Commonly known as: Fosamax  Take 1 tablet (70 mg) by mouth every 7 days. Take in the morning with a full glass of water, on an empty stomach, and do not take anything else by mouth or lie down for the next 30 min.  Medication Adjustments for Surgery: Do Not take on the morning of surgery     atorvastatin 40 mg tablet  Commonly known as: Lipitor  Take 1 tablet (40 mg) by mouth once daily.  Medication Adjustments for Surgery: Take/Use as prescribed     azithromycin 500 mg tablet  Commonly known as: Zithromax  Medication Adjustments for Surgery: Take/Use as prescribed     * chlorhexidine 0.12 % solution  Commonly known as: Peridex  Use 15 mL in the mouth or throat once daily for 2 doses. Swish and Spit day before surgery and again morning on day of surgery.     * chlorhexidine 4 % external liquid  Commonly known as: Hibiclens  Apply topically once daily for 5 days. Wash daily for 5 days prior to surgery with day 5 being morning of surgery.     Comp-Air Nebulizer Compressor device  Generic drug: nebulizer and compressor  Medication Adjustments for Surgery: Take/Use as prescribed     furosemide 20 mg tablet  Commonly known as: Lasix  Take 1 tablet (20 mg) by mouth once daily as needed (edema).  Medication Adjustments for Surgery: Do Not take on the morning of surgery     gabapentin 300 mg capsule  Commonly known as: Neurontin  TAKE 1 CAPSULE BY MOUTH 3 TIMES  DAILY  Medication Adjustments for Surgery: Take/Use as prescribed     ipratropium-albuteroL 0.5-2.5 mg/3 mL nebulizer solution  Commonly known as: Duo-Neb  Take 3 mL by nebulization 4 times a day as needed for wheezing or shortness of breath.  Medication  Adjustments for Surgery: Take/Use as prescribed     losartan-hydrochlorothiazide 100-25 mg tablet  Commonly known as: Hyzaar  TAKE 1 TABLET BY MOUTH ONCE  DAILY  Medication Adjustments for Surgery: Take last dose 1 day (24 hours) before surgery  Additional Medication Adjustments for Surgery: Other (Comment)  Notes to patient: Do not take night before or day of surgery - (Do not take 24 hours before surgery)     metoprolol succinate XL 50 mg 24 hr tablet  Commonly known as: Toprol-XL  TAKE 1 TABLET BY MOUTH ONCE  DAILY  Medication Adjustments for Surgery: Take/Use as prescribed     pramipexole 0.5 mg tablet  Commonly known as: Mirapex  Take 1 tablet (0.5 mg) by mouth 2 times a day.  Medication Adjustments for Surgery: Take/Use as prescribed     traZODone 50 mg tablet  Commonly known as: Desyrel  Take 1 tablet (50 mg) by mouth as needed at bedtime for sleep.  Medication Adjustments for Surgery: Do Not take on the morning of surgery     Trelegy Ellipta 200-62.5-25 mcg blister with device  Generic drug: fluticasone-umeclidin-vilanter  INHALE 1 INHALATION BY MOUTH  ONCE DAILY  Medication Adjustments for Surgery: Take/Use as prescribed           * This list has 2 medication(s) that are the same as other medications prescribed for you. Read the directions carefully, and ask your doctor or other care provider to review them with you.                                  NPO Instructions:    Do not eat any food after midnight the night before your surgery/procedure.    Additional Instructions:     Day of Surgery:  Review your medication instructions, take indicated medications  You may have clear liquids until TWO hours before surgery/procedure.  This includes water, black tea/coffee, (no milk or cream) apple juice and electrolyte drinks (Gatorade)  Wear  comfortable loose fitting clothing  Do not use moisturizers, creams, lotions or perfume  All jewelry and valuables should be left at home          PRE-OPERATIVE INSTRUCTIONS  FOR SURGERY    *Do not eat anything after midnight the night of surgery.  This includes food of any kind (including hard candy, cough drops, mints).   You may have up to 13 ounces of clear liquid  until TWO hours prior to your scheduled surgery time, unless ERAS* protocol is ordered for you.  Clear liquids include water, black tea/coffee, (no milk or cream) apple juice and electrolyte drinks (GATORADE).  You may chew gum until TWO hours prior you your surgery/procedure.     *ERAS protocol: follow as instructed.  DO not drink an additional 13 ounces as noted above.        *One of our staff members will call you ONE business day before your surgery, between 11 am-2 pm to let you know the time to arrive.  If you have not received a call by 2 pm, call [phone]  *When you arrive at the hospital-->GO TO Registration on the ground floor  *Stop smoking 24 hours prior to surgery.  No Marijuana, CBD Oil or Vaping for 48 hours  *No alcohol 24 hours prior to surgery  *You will need a responsible adult to drive you home  -No acrylic nails or nail polish on at least one fingernail, NO polish on toes for foot surgery  -You may be asked to remove your dentures, partial plate, eyeglasses or contact lenses before going to surgery.  Please bring a case for these items.  -Body piercings need to be removed.  Jewelry and valuables should be left at home.  -Put on loose,  comfortable, clean clothing, that will accommodate bandages        What is a home antibacterial shower?  This shower is a way of cleaning the skin with a germ killing solution before surgery.  The solution contains chlorhexidine, commonly known as CHG.  CHG is a skin cleanser with germ killing ability.  Let your doctor know if you are allergic to chlorhexidine.    Why do I need to take a preoperative antibacterial shower?  Skin is not sterile.  It is best to try to make your skin as free of germs as possible before surgery.  Proper cleansing with a germ killing soap  before surgery can lower the number of germs on your skin.  This helps to reduce the risk of infection at the surgical site.  Following the instructions listed below will help you prepare your skin for surgery.      How do I use the solution?    Steps: Begin using your CHG soap 5 days before your surgery on ___12/8-12/13_______________.     Keep CHG soap away from ear canals and eyes.   Rinse completely, do not condition.  Hair extensions should be removed.    *Wash your face with your normal soap and rinse.   *Apply the CHG solution to a clean wet washcloth.  Turn the water off or move away from the water spray to avoid premature rinsing of the CHG soap as you are applying.  Firmly lather your entire body from the neck down.  Do not use on your face.    *Pay special attention to the area(s) where your incision(s) will be located unless they are on your face.  Avoid scrubbing your skin too hard.  The important part is to have the CHG soap sit on your skin for 3 minutes.   *When the 3 minutes are up, turn on the water and rinse the CHG solution off your body completely.  *Do not wash with regular soap after you  have  used the CHG soap solution.  *Pat  yourself dry with a clean, freshly laundered towel.  *Do not apply powders, deodorants or lotions.  *Dress in clean freshly laundered night clothes.    *Be sure to sleep with clean freshly laundered sheets.    *Be aware the CHG will cause stains on fabrics; if you wash them with bleach after use.  Rinse your washcloth and other linens that have contact with CHG completely.  Use only non-chlorine detergents to launder the items  used.  *The morning of surgery is the fifth day.  Repeat the above steps and dress in clean comfortable clothing.     What is oral/dental rinse?  It is mouthwash.  It is a way of cleaning the he mouth with a germ-killing solution before your surgery.  The solution contains chlorhexidine, commonly known as CHG.  It is used inside the mouth to  kill a bacteria known as Staphylococcus aureus.  Let your doctor know if you are allergic to Chlorhexidine.    Why do I need to use CHG oral/ dental rinse?  The CHG oral/dental rinse helps to kill bacteria in your mouth know as Staphylococcus aureus.  This reduces the risk of infection at the surgical site.    Using your CHG oral/dental rinse    STEPS:  12/12-12/13  Use your CHG oral/dental rinse after you brush your teeth the night before (at bedtime) and the morning of your surgery.  Follow all the directions on your prescription label.  *Use the cap on the container to measure 15 ml  *Swish (gargle if you can) the mouthwash in your mouth for at least 30 seconds, (do not swallow) and spit out  *After you use your CHG rinse, do not rinse your mouth with water, drink or eat.  Please refer to the prescription label for the appropriate time to resume oral intake.    What side effects might I have using the CHG oral/dental rinse?  CHG rinse will stick you plaque on the teeth.  Brush and floss just before use.   Teeth brushing will help avoid staining of the plaque during  use.  Teeth brushing will help avoid staining of plaque during  use.    Who should I contact if I have questions about the CHG oral/dental rinse and or CHG soap?  Please call [facility], Pre-Admission testing at [phone] if you have any questions.    What you may be asked to bring to surgery:  ___Crutches, walker  ___CPAP machine  ___Urine specimen

## 2024-12-04 NOTE — CPM/PAT H&P
CPM/PAT Evaluation       Name: Kassy Lockett (Kassy Lockett)  /Age: 1955/69 y.o.     In-Person       Chief Complaint: PAT for planned vascular procedure    69 yr old female w/PHx of COPD, hx respiratory failure x2 (2023 & 2024), HTN, HLD, osteoporosis, cervical CA (), RLS and venous insufficiency referred to PAT for planned Bilateral iliac veins & inferior vena cava intravascular ultrasound w/c-arm/possible intervention w/Dr Mercado on 2024    Patient reports feeling overall well, denies fever or recent infection. Reports remaining otherwise physically active, maintain home independently; denies cardiac or new respiratory symptoms, although does reports sob w/exertion. Currently uses 2L O2 nasal cannula at home as needed.  Past surgical hx includes cervical/uterine, lymph node removal (groin) and most recently cholectomy w/complications including return to OR for further treatment (infected pancreas) (); denies past issues with anesthesia.      Followed by PCP (Anabela Beckham MD) - last visit 10/23/2024  Followed by pulmonary (Alyssa Levi MD) - last visit 2024 - pulmonary clearance requested and pending (faxed to [phone]    Pertinent info:  Hospitalized [facility] for respiratory failure (COPD exacerbation) on 2024 and prior on 2023.  Reports current use of oxygen 2L nasal cannula at home.        Past Medical History:   Diagnosis Date    COPD (chronic obstructive pulmonary disease) (Multi)     Malignant neoplasm of cervix uteri, unspecified     Primary cervical cancer    Oxygen dependent     PRN 2 Liters nasal cannula - home use    Personal history of other diseases of the respiratory system 2019    History of bronchitis       Past Surgical History:   Procedure Laterality Date    CHOLECYSTECTOMY  2018    Cholecystectomy    OTHER SURGICAL HISTORY  2016    Cervical Surgery (Gyn)       Patient  reports that she is not currently sexually active and has had partner(s)  who are male. She reports using the following method of birth control/protection: None.    Family History   Problem Relation Name Age of Onset    COPD Mother Sylvie     Lung cancer Mother Sylvie     Breast cancer Mother Sylvie     Heart attack Father Gopal     Heart disease Father Gopal     Cancer Sister      Cancer Sister Kelley     Cancer Sister Anju     COPD Brother Star     Diabetes Mother's Sister Kita     Breast cancer Mother's Sister Kita     Breast cancer Mother's Sister      Diabetes Mother's Brother Sebastian     Diabetes Mother's Brother Gregory        No Known Allergies    Prior to Admission medications    Medication Sig Start Date End Date Taking? Authorizing Provider   albuterol 90 mcg/actuation inhaler USE 1 TO 2 INHALATIONS BY  MOUTH EVERY 4 TO 6 HOURS AS NEEDED 4/12/23   Chance Fisher,    alendronate (Fosamax) 70 mg tablet Take 1 tablet (70 mg) by mouth every 7 days. Take in the morning with a full glass of water, on an empty stomach, and do not take anything else by mouth or lie down for the next 30 min. 10/23/24 10/23/25  Anabela Beckham MD   atorvastatin (Lipitor) 40 mg tablet Take 1 tablet (40 mg) by mouth once daily. 10/8/24 11/12/25  Anabela Beckham MD   chlorhexidine (Hibiclens) 4 % external liquid Apply topically once daily for 5 days. Wash daily for 5 days prior to surgery with day 5 being morning of surgery. 12/4/24 12/9/24  CHANDLER Frazier   chlorhexidine (Peridex) 0.12 % solution Use 15 mL in the mouth or throat once daily for 2 doses. Swish and Spit day before surgery and again morning on day of surgery. 12/4/24 12/6/24  CHANDLER Frazier   Comp-Air Nebulizer Compressor device use as directed 6/12/23   Historical Provider, MD   furosemide (Lasix) 20 mg tablet Take 1 tablet (20 mg) by mouth once daily as needed (edema). 5/22/24 5/22/25  Anabela Beckham MD   gabapentin (Neurontin) 300 mg capsule TAKE 1 CAPSULE BY MOUTH 3 TIMES  DAILY 7/15/24   Chance Fisher,  DO   ipratropium-albuteroL (Duo-Neb) 0.5-2.5 mg/3 mL nebulizer solution Take 3 mL by nebulization 4 times a day as needed for wheezing or shortness of breath. 6/16/23 6/15/24  Chance Fisher DO   losartan-hydrochlorothiazide (Hyzaar) 100-25 mg tablet TAKE 1 TABLET BY MOUTH ONCE  DAILY 2/24/24   Chance Fisher DO   metoprolol succinate XL (Toprol-XL) 50 mg 24 hr tablet TAKE 1 TABLET BY MOUTH ONCE  DAILY 2/24/24   Chance Fisher DO   pramipexole (Mirapex) 0.5 mg tablet Take 1 tablet (0.5 mg) by mouth 2 times a day. 12/3/24   Anabela Beckham MD   traZODone (Desyrel) 50 mg tablet Take 1 tablet (50 mg) by mouth as needed at bedtime for sleep. 10/8/24 10/8/25  Anabela Beckham MD   Trelegy Ellipta 200-62.5-25 mcg blister with device INHALE 1 INHALATION BY MOUTH  ONCE DAILY 5/21/24   Chance Fisher DO   pramipexole (Mirapex) 0.5 mg tablet TAKE 1 OR 2 TABLETS BY MOUTH AT  BEDTIME FOR RESTLESS LEGS 3/4/24 12/3/24  Chance Fisher DO        Review of Systems    Constitutional: no fever, no chills and not feeling poorly.   Eyes: no eyesight problems.   ENT: no hearing loss, no nosebleeds and no sore throat.   Cardiovascular: no chest pain, no palpitations and no extremity edema.   Respiratory: 2L O2 nasal cannula prn, chronic cough, no wheezing, +sob w/exertion.   Gastrointestinal: negative for abdominal pain, blood in stools or changes in bowel habits   Genitourinary: negative for dysuria, incontinence or changes in urinary habits   Musculoskeletal: no arthralgias, ambulates independently.   Integumentary: negative for lesions, rash or itching.   Neurological: negative for confusion, dizziness or fainting.   Psychiatric: not suicidal, no anxiety and no depression.   All other systems have been reviewed and are negative for complaint.     Physical Exam  Vitals reviewed.   Constitutional:       Appearance: Normal appearance.   HENT:      Head: Normocephalic.      Mouth/Throat:      Mouth: Mucous membranes are moist.   Eyes:       Pupils: Pupils are equal, round, and reactive to light.      Comments: Corrective lenses   Cardiovascular:      Rate and Rhythm: Normal rate and regular rhythm.   Pulmonary:      Effort: Pulmonary effort is normal.      Breath sounds: Wheezing present.      Comments: Diminished bases L > R  Abdominal:      General: Bowel sounds are normal.   Musculoskeletal:         General: Normal range of motion.      Right lower leg: Edema present.      Left lower leg: Edema present.      Comments: R > L    Skin:     General: Skin is warm and dry.   Neurological:      Mental Status: She is alert and oriented to person, place, and time.   Psychiatric:         Mood and Affect: Mood normal.          PAT AIRWAY:   Airway:     Mallampati::  II    TM distance::  <3 FB    Neck ROM::  Full  normal        Testing/Diagnostic: BMP, CMP, staph/MRSA, ecg    Patient Specialist/PCP: Anabela Beckham MD (PCP) 10/23/2024; Alyssa Levi MD (pulmonary) 11/17/2024    Visit Vitals  /79   Pulse 62   Temp 36 °C (96.8 °F) (Temporal)   Resp 18       DASI Risk Score      Flowsheet Row Pre-Admission Testing [facility]   Can you take care of yourself (eat, dress, bathe, or use toilet)?  2.75 filed at 12/04/2024 1240   Can you walk indoors, such as around your house? 1.75 filed at 12/04/2024 1240   Can you walk a block or two on level ground?  2.75 filed at 12/04/2024 1240   Can you climb a flight of stairs or walk up a hill? 5.5 filed at 12/04/2024 1240   Can you run a short distance? 8 filed at 12/04/2024 1240   Can you do light work around the house like dusting or washing dishes? 2.7 filed at 12/04/2024 1240   Can you do moderate work around the house like vacuuming, sweeping floors or carrying groceries? 3.5 filed at 12/04/2024 1240   Can you do heavy work around the house like scrubbing floors or lifting and moving heavy furniture?  8 filed at 12/04/2024 1240   Can you do yard work like raking leaves, weeding or pushing  a mower? 4.5 filed at 12/04/2024 1240   Can you have sexual relations? 0 filed at 12/04/2024 1240   Can you participate in moderate recreational activities like golf, bowling, dancing, doubles tennis or throwing a baseball or football? 6 filed at 12/04/2024 1240   Can you participate in strenous sports like swimming, singles tennis, football, basketball, or skiing? 0 filed at 12/04/2024 1240   DASI SCORE 45.45 filed at 12/04/2024 1240   METS Score (Will be calculated only when all the questions are answered) 8.3 filed at 12/04/2024 1240          Caprini DVT Assessment    No data to display       Modified Frailty Index    No data to display       CHADS2 Stroke Risk  Current as of about an hour ago        N/A 3 to 100%: High Risk   2 to < 3%: Medium Risk   0 to < 2%: Low Risk     Last Change: N/A          This score determines the patient's risk of having a stroke if the patient has atrial fibrillation.        This score is not applicable to this patient. Components are not calculated.          Revised Cardiac Risk Index    No data to display       Apfel Simplified Score    No data to display       Risk Analysis Index Results This Encounter    No data found in the last 10 encounters.       Prodigy: High Risk  Total Score: 8              Prodigy Age Score           ARISCAT Score for Postoperative Pulmonary Complications    No data to display       Chaudhary Perioperative Risk for Myocardial Infarction or Cardiac Arrest (MATTHEW)    No data to display         Assessment and Plan:     # COPD - continue inhalers, uses O2 2L nasal cannula therapy as needed - followed by pulmonary  # HTN - continue metoprolol succinate; hold losartan-hydrochlorothiazide evening before and day of surgery  # HLD - continue atorvastatin  # Osteoporosis - fosamax therapy once weekly, followed by PCP  # RLS - continue pramipexole   # Chronic venous insufficiency of lower extremity - Bilateral iliac veins & inferior vena cava intravascular ultrasound  w/c-arm/possible intervention w/Dr Mercado on 12/13/2024    Ecg performed 8/13/2024 at [facility] ED  Medical hx, Allergies, VS and Labs reviewed  Medications addressed w/pre-op instructions provided    Pulmonary clearance requested and pending

## 2024-12-04 NOTE — H&P (VIEW-ONLY)
CPM/PAT Evaluation       Name: Kassy Lockett (Kassy Lockett)  /Age: 1955/69 y.o.     In-Person       Chief Complaint: PAT for planned vascular procedure    69 yr old female w/PHx of COPD, hx respiratory failure x2 (2023 & 2024), HTN, HLD, osteoporosis, cervical CA (), RLS and venous insufficiency referred to PAT for planned Bilateral iliac veins & inferior vena cava intravascular ultrasound w/c-arm/possible intervention w/Dr Mercado on 2024    Patient reports feeling overall well, denies fever or recent infection. Reports remaining otherwise physically active, maintain home independently; denies cardiac or new respiratory symptoms, although does reports sob w/exertion. Currently uses 2L O2 nasal cannula at home as needed.  Past surgical hx includes cervical/uterine, lymph node removal (groin) and most recently cholectomy w/complications including return to OR for further treatment (infected pancreas) (); denies past issues with anesthesia.      Followed by PCP (Anabela Beckham MD) - last visit 10/23/2024  Followed by pulmonary (Alyssa Levi MD) - last visit 2024 - pulmonary clearance requested and pending (faxed to [phone]    Pertinent info:  Hospitalized [facility] for respiratory failure (COPD exacerbation) on 2024 and prior on 2023.  Reports current use of oxygen 2L nasal cannula at home.        Past Medical History:   Diagnosis Date    COPD (chronic obstructive pulmonary disease) (Multi)     Malignant neoplasm of cervix uteri, unspecified     Primary cervical cancer    Oxygen dependent     PRN 2 Liters nasal cannula - home use    Personal history of other diseases of the respiratory system 2019    History of bronchitis       Past Surgical History:   Procedure Laterality Date    CHOLECYSTECTOMY  2018    Cholecystectomy    OTHER SURGICAL HISTORY  2016    Cervical Surgery (Gyn)       Patient  reports that she is not currently sexually active and has had partner(s)  who are male. She reports using the following method of birth control/protection: None.    Family History   Problem Relation Name Age of Onset    COPD Mother Sylive     Lung cancer Mother Sylvie     Breast cancer Mother Sylvie     Heart attack Father Gopal     Heart disease Father Gopal     Cancer Sister      Cancer Sister Kelley     Cancer Sister Anju     COPD Brother Star     Diabetes Mother's Sister Kita     Breast cancer Mother's Sister Kita     Breast cancer Mother's Sister      Diabetes Mother's Brother Sebastian     Diabetes Mother's Brother Gregory        No Known Allergies    Prior to Admission medications    Medication Sig Start Date End Date Taking? Authorizing Provider   albuterol 90 mcg/actuation inhaler USE 1 TO 2 INHALATIONS BY  MOUTH EVERY 4 TO 6 HOURS AS NEEDED 4/12/23   Chance Fisher,    alendronate (Fosamax) 70 mg tablet Take 1 tablet (70 mg) by mouth every 7 days. Take in the morning with a full glass of water, on an empty stomach, and do not take anything else by mouth or lie down for the next 30 min. 10/23/24 10/23/25  Anabela Beckham MD   atorvastatin (Lipitor) 40 mg tablet Take 1 tablet (40 mg) by mouth once daily. 10/8/24 11/12/25  Anabela Beckham MD   chlorhexidine (Hibiclens) 4 % external liquid Apply topically once daily for 5 days. Wash daily for 5 days prior to surgery with day 5 being morning of surgery. 12/4/24 12/9/24  CHANDLER Frazier   chlorhexidine (Peridex) 0.12 % solution Use 15 mL in the mouth or throat once daily for 2 doses. Swish and Spit day before surgery and again morning on day of surgery. 12/4/24 12/6/24  CHANDLER Frazier   Comp-Air Nebulizer Compressor device use as directed 6/12/23   Historical Provider, MD   furosemide (Lasix) 20 mg tablet Take 1 tablet (20 mg) by mouth once daily as needed (edema). 5/22/24 5/22/25  Anabela Beckham MD   gabapentin (Neurontin) 300 mg capsule TAKE 1 CAPSULE BY MOUTH 3 TIMES  DAILY 7/15/24   Chance Fisher,  DO   ipratropium-albuteroL (Duo-Neb) 0.5-2.5 mg/3 mL nebulizer solution Take 3 mL by nebulization 4 times a day as needed for wheezing or shortness of breath. 6/16/23 6/15/24  Chance Fisher DO   losartan-hydrochlorothiazide (Hyzaar) 100-25 mg tablet TAKE 1 TABLET BY MOUTH ONCE  DAILY 2/24/24   Chance Fisher DO   metoprolol succinate XL (Toprol-XL) 50 mg 24 hr tablet TAKE 1 TABLET BY MOUTH ONCE  DAILY 2/24/24   Chance Fisher DO   pramipexole (Mirapex) 0.5 mg tablet Take 1 tablet (0.5 mg) by mouth 2 times a day. 12/3/24   Anabela Beckham MD   traZODone (Desyrel) 50 mg tablet Take 1 tablet (50 mg) by mouth as needed at bedtime for sleep. 10/8/24 10/8/25  Anabela Beckham MD   Trelegy Ellipta 200-62.5-25 mcg blister with device INHALE 1 INHALATION BY MOUTH  ONCE DAILY 5/21/24   Chance Fisher DO   pramipexole (Mirapex) 0.5 mg tablet TAKE 1 OR 2 TABLETS BY MOUTH AT  BEDTIME FOR RESTLESS LEGS 3/4/24 12/3/24  Chance Fisher DO        Review of Systems    Constitutional: no fever, no chills and not feeling poorly.   Eyes: no eyesight problems.   ENT: no hearing loss, no nosebleeds and no sore throat.   Cardiovascular: no chest pain, no palpitations and no extremity edema.   Respiratory: 2L O2 nasal cannula prn, chronic cough, no wheezing, +sob w/exertion.   Gastrointestinal: negative for abdominal pain, blood in stools or changes in bowel habits   Genitourinary: negative for dysuria, incontinence or changes in urinary habits   Musculoskeletal: no arthralgias, ambulates independently.   Integumentary: negative for lesions, rash or itching.   Neurological: negative for confusion, dizziness or fainting.   Psychiatric: not suicidal, no anxiety and no depression.   All other systems have been reviewed and are negative for complaint.     Physical Exam  Vitals reviewed.   Constitutional:       Appearance: Normal appearance.   HENT:      Head: Normocephalic.      Mouth/Throat:      Mouth: Mucous membranes are moist.   Eyes:       Pupils: Pupils are equal, round, and reactive to light.      Comments: Corrective lenses   Cardiovascular:      Rate and Rhythm: Normal rate and regular rhythm.   Pulmonary:      Effort: Pulmonary effort is normal.      Breath sounds: Wheezing present.      Comments: Diminished bases L > R  Abdominal:      General: Bowel sounds are normal.   Musculoskeletal:         General: Normal range of motion.      Right lower leg: Edema present.      Left lower leg: Edema present.      Comments: R > L    Skin:     General: Skin is warm and dry.   Neurological:      Mental Status: She is alert and oriented to person, place, and time.   Psychiatric:         Mood and Affect: Mood normal.          PAT AIRWAY:   Airway:     Mallampati::  II    TM distance::  <3 FB    Neck ROM::  Full  normal        Testing/Diagnostic: BMP, CMP, staph/MRSA, ecg    Patient Specialist/PCP: Anabela Beckham MD (PCP) 10/23/2024; Alyssa Levi MD (pulmonary) 11/17/2024    Visit Vitals  /79   Pulse 62   Temp 36 °C (96.8 °F) (Temporal)   Resp 18       DASI Risk Score      Flowsheet Row Pre-Admission Testing from 12/4/2024 in [facility]   Can you take care of yourself (eat, dress, bathe, or use toilet)?  2.75 filed at 12/04/2024 1240   Can you walk indoors, such as around your house? 1.75 filed at 12/04/2024 1240   Can you walk a block or two on level ground?  2.75 filed at 12/04/2024 1240   Can you climb a flight of stairs or walk up a hill? 5.5 filed at 12/04/2024 1240   Can you run a short distance? 8 filed at 12/04/2024 1240   Can you do light work around the house like dusting or washing dishes? 2.7 filed at 12/04/2024 1240   Can you do moderate work around the house like vacuuming, sweeping floors or carrying groceries? 3.5 filed at 12/04/2024 1240   Can you do heavy work around the house like scrubbing floors or lifting and moving heavy furniture?  8 filed at 12/04/2024 1240   Can you do yard work like raking leaves, weeding or pushing  a mower? 4.5 filed at 12/04/2024 1240   Can you have sexual relations? 0 filed at 12/04/2024 1240   Can you participate in moderate recreational activities like golf, bowling, dancing, doubles tennis or throwing a baseball or football? 6 filed at 12/04/2024 1240   Can you participate in strenous sports like swimming, singles tennis, football, basketball, or skiing? 0 filed at 12/04/2024 1240   DASI SCORE 45.45 filed at 12/04/2024 1240   METS Score (Will be calculated only when all the questions are answered) 8.3 filed at 12/04/2024 1240          Caprini DVT Assessment    No data to display       Modified Frailty Index    No data to display       CHADS2 Stroke Risk  Current as of about an hour ago        N/A 3 to 100%: High Risk   2 to < 3%: Medium Risk   0 to < 2%: Low Risk     Last Change: N/A          This score determines the patient's risk of having a stroke if the patient has atrial fibrillation.        This score is not applicable to this patient. Components are not calculated.          Revised Cardiac Risk Index    No data to display       Apfel Simplified Score    No data to display       Risk Analysis Index Results This Encounter    No data found in the last 10 encounters.       Prodigy: High Risk  Total Score: 8              Prodigy Age Score           ARISCAT Score for Postoperative Pulmonary Complications    No data to display       Chaudhary Perioperative Risk for Myocardial Infarction or Cardiac Arrest (MATTHEW)    No data to display         Assessment and Plan:     # COPD - continue inhalers, uses O2 2L nasal cannula therapy as needed - followed by pulmonary  # HTN - continue metoprolol succinate; hold losartan-hydrochlorothiazide evening before and day of surgery  # HLD - continue atorvastatin  # Osteoporosis - fosamax therapy once weekly, followed by PCP  # RLS - continue pramipexole   # Chronic venous insufficiency of lower extremity - Bilateral iliac veins & inferior vena cava intravascular ultrasound  w/c-arm/possible intervention w/Dr Mercado on 12/13/2024    Ecg performed 8/13/2024 at [facility] ED  Medical hx, Allergies, VS and Labs reviewed  Medications addressed w/pre-op instructions provided    Pulmonary clearance requested and pending

## 2024-12-06 LAB — STAPHYLOCOCCUS SPEC CULT: NORMAL

## 2024-12-13 ENCOUNTER — APPOINTMENT (OUTPATIENT)
Dept: RADIOLOGY | Facility: HOSPITAL | Age: 69
End: 2024-12-13
Payer: MEDICARE

## 2024-12-13 ENCOUNTER — ANESTHESIA EVENT (OUTPATIENT)
Dept: OPERATING ROOM | Facility: HOSPITAL | Age: 69
End: 2024-12-13
Payer: MEDICARE

## 2024-12-13 ENCOUNTER — HOSPITAL ENCOUNTER (OUTPATIENT)
Facility: HOSPITAL | Age: 69
Setting detail: OUTPATIENT SURGERY
Discharge: HOME | End: 2024-12-13
Attending: SURGERY | Admitting: SURGERY
Payer: MEDICARE

## 2024-12-13 ENCOUNTER — ANESTHESIA (OUTPATIENT)
Dept: OPERATING ROOM | Facility: HOSPITAL | Age: 69
End: 2024-12-13
Payer: MEDICARE

## 2024-12-13 VITALS
WEIGHT: 152 LBS | RESPIRATION RATE: 20 BRPM | SYSTOLIC BLOOD PRESSURE: 139 MMHG | HEART RATE: 58 BPM | TEMPERATURE: 98.4 F | OXYGEN SATURATION: 95 % | DIASTOLIC BLOOD PRESSURE: 63 MMHG | BODY MASS INDEX: 21.28 KG/M2 | HEIGHT: 71 IN

## 2024-12-13 DIAGNOSIS — I87.2 CHRONIC VENOUS INSUFFICIENCY OF LOWER EXTREMITY: Primary | ICD-10-CM

## 2024-12-13 DIAGNOSIS — M79.606 PAIN AND SWELLING OF LOWER EXTREMITY, UNSPECIFIED LATERALITY: ICD-10-CM

## 2024-12-13 DIAGNOSIS — M79.89 PAIN AND SWELLING OF LOWER EXTREMITY, UNSPECIFIED LATERALITY: ICD-10-CM

## 2024-12-13 DIAGNOSIS — I87.2 VENOUS STASIS DERMATITIS OF BOTH LOWER EXTREMITIES: ICD-10-CM

## 2024-12-13 PROCEDURE — 3600000003 HC OR TIME - INITIAL BASE CHARGE - PROCEDURE LEVEL THREE: Performed by: SURGERY

## 2024-12-13 PROCEDURE — 2720000007 HC OR 272 NO HCPCS: Performed by: SURGERY

## 2024-12-13 PROCEDURE — 3700000002 HC GENERAL ANESTHESIA TIME - EACH INCREMENTAL 1 MINUTE: Performed by: SURGERY

## 2024-12-13 PROCEDURE — C1769 GUIDE WIRE: HCPCS | Performed by: SURGERY

## 2024-12-13 PROCEDURE — C1894 INTRO/SHEATH, NON-LASER: HCPCS | Performed by: SURGERY

## 2024-12-13 PROCEDURE — 2500000004 HC RX 250 GENERAL PHARMACY W/ HCPCS (ALT 636 FOR OP/ED): Mod: JZ | Performed by: SURGERY

## 2024-12-13 PROCEDURE — C1753 CATH, INTRAVAS ULTRASOUND: HCPCS | Performed by: SURGERY

## 2024-12-13 PROCEDURE — 2500000004 HC RX 250 GENERAL PHARMACY W/ HCPCS (ALT 636 FOR OP/ED): Performed by: SURGERY

## 2024-12-13 PROCEDURE — 2500000004 HC RX 250 GENERAL PHARMACY W/ HCPCS (ALT 636 FOR OP/ED)

## 2024-12-13 PROCEDURE — 3700000001 HC GENERAL ANESTHESIA TIME - INITIAL BASE CHARGE: Performed by: SURGERY

## 2024-12-13 PROCEDURE — 2500000002 HC RX 250 W HCPCS SELF ADMINISTERED DRUGS (ALT 637 FOR MEDICARE OP, ALT 636 FOR OP/ED)

## 2024-12-13 PROCEDURE — 76000 FLUOROSCOPY <1 HR PHYS/QHP: CPT

## 2024-12-13 PROCEDURE — 37252 INTRVASC US NONCORONARY 1ST: CPT | Performed by: SURGERY

## 2024-12-13 PROCEDURE — 7100000010 HC PHASE TWO TIME - EACH INCREMENTAL 1 MINUTE: Performed by: SURGERY

## 2024-12-13 PROCEDURE — 37253 INTRVASC US NONCORONARY ADDL: CPT | Performed by: SURGERY

## 2024-12-13 PROCEDURE — 2550000001 HC RX 255 CONTRASTS: Performed by: SURGERY

## 2024-12-13 PROCEDURE — 7100000009 HC PHASE TWO TIME - INITIAL BASE CHARGE: Performed by: SURGERY

## 2024-12-13 PROCEDURE — 2780000003 HC OR 278 NO HCPCS: Performed by: SURGERY

## 2024-12-13 PROCEDURE — 3600000008 HC OR TIME - EACH INCREMENTAL 1 MINUTE - PROCEDURE LEVEL THREE: Performed by: SURGERY

## 2024-12-13 PROCEDURE — 75822 VEIN X-RAY ARMS/LEGS: CPT | Performed by: SURGERY

## 2024-12-13 PROCEDURE — 36005 INJECTION EXT VENOGRAPHY: CPT | Performed by: SURGERY

## 2024-12-13 PROCEDURE — 36012 PLACE CATHETER IN VEIN: CPT | Performed by: SURGERY

## 2024-12-13 PROCEDURE — 7100000002 HC RECOVERY ROOM TIME - EACH INCREMENTAL 1 MINUTE: Performed by: SURGERY

## 2024-12-13 PROCEDURE — 7100000001 HC RECOVERY ROOM TIME - INITIAL BASE CHARGE: Performed by: SURGERY

## 2024-12-13 PROCEDURE — 2500000004 HC RX 250 GENERAL PHARMACY W/ HCPCS (ALT 636 FOR OP/ED): Performed by: ANESTHESIOLOGIST ASSISTANT

## 2024-12-13 RX ORDER — ONDANSETRON HYDROCHLORIDE 2 MG/ML
INJECTION, SOLUTION INTRAVENOUS AS NEEDED
Status: DISCONTINUED | OUTPATIENT
Start: 2024-12-13 | End: 2024-12-13

## 2024-12-13 RX ORDER — MEPERIDINE HYDROCHLORIDE 25 MG/ML
12.5 INJECTION INTRAMUSCULAR; INTRAVENOUS; SUBCUTANEOUS EVERY 10 MIN PRN
Status: DISCONTINUED | OUTPATIENT
Start: 2024-12-13 | End: 2024-12-13 | Stop reason: HOSPADM

## 2024-12-13 RX ORDER — LIDOCAINE HYDROCHLORIDE 10 MG/ML
INJECTION, SOLUTION INFILTRATION; PERINEURAL AS NEEDED
Status: DISCONTINUED | OUTPATIENT
Start: 2024-12-13 | End: 2024-12-13 | Stop reason: HOSPADM

## 2024-12-13 RX ORDER — PROPOFOL 10 MG/ML
INJECTION, EMULSION INTRAVENOUS AS NEEDED
Status: DISCONTINUED | OUTPATIENT
Start: 2024-12-13 | End: 2024-12-13

## 2024-12-13 RX ORDER — MEPERIDINE HYDROCHLORIDE 25 MG/ML
12.5 INJECTION INTRAMUSCULAR; INTRAVENOUS; SUBCUTANEOUS EVERY 10 MIN PRN
OUTPATIENT
Start: 2024-12-13

## 2024-12-13 RX ORDER — SODIUM CHLORIDE, SODIUM LACTATE, POTASSIUM CHLORIDE, CALCIUM CHLORIDE 600; 310; 30; 20 MG/100ML; MG/100ML; MG/100ML; MG/100ML
100 INJECTION, SOLUTION INTRAVENOUS CONTINUOUS
Status: DISCONTINUED | OUTPATIENT
Start: 2024-12-13 | End: 2024-12-13 | Stop reason: HOSPADM

## 2024-12-13 RX ORDER — MIDAZOLAM HYDROCHLORIDE 1 MG/ML
INJECTION, SOLUTION INTRAMUSCULAR; INTRAVENOUS AS NEEDED
Status: DISCONTINUED | OUTPATIENT
Start: 2024-12-13 | End: 2024-12-13

## 2024-12-13 RX ORDER — IPRATROPIUM BROMIDE AND ALBUTEROL SULFATE 2.5; .5 MG/3ML; MG/3ML
SOLUTION RESPIRATORY (INHALATION)
Status: COMPLETED
Start: 2024-12-13 | End: 2024-12-13

## 2024-12-13 RX ORDER — LIDOCAINE HYDROCHLORIDE 10 MG/ML
0.1 INJECTION, SOLUTION INFILTRATION; PERINEURAL ONCE
Status: DISCONTINUED | OUTPATIENT
Start: 2024-12-13 | End: 2024-12-13 | Stop reason: HOSPADM

## 2024-12-13 RX ORDER — GLYCOPYRROLATE 0.2 MG/ML
INJECTION INTRAMUSCULAR; INTRAVENOUS
Status: COMPLETED
Start: 2024-12-13 | End: 2024-12-13

## 2024-12-13 RX ORDER — SODIUM CHLORIDE, SODIUM LACTATE, POTASSIUM CHLORIDE, CALCIUM CHLORIDE 600; 310; 30; 20 MG/100ML; MG/100ML; MG/100ML; MG/100ML
100 INJECTION, SOLUTION INTRAVENOUS CONTINUOUS
OUTPATIENT
Start: 2024-12-13 | End: 2024-12-15

## 2024-12-13 RX ORDER — FENTANYL CITRATE 50 UG/ML
50 INJECTION, SOLUTION INTRAMUSCULAR; INTRAVENOUS EVERY 5 MIN PRN
Status: DISCONTINUED | OUTPATIENT
Start: 2024-12-13 | End: 2024-12-13 | Stop reason: HOSPADM

## 2024-12-13 RX ORDER — FENTANYL CITRATE 50 UG/ML
25 INJECTION, SOLUTION INTRAMUSCULAR; INTRAVENOUS EVERY 5 MIN PRN
Status: DISCONTINUED | OUTPATIENT
Start: 2024-12-13 | End: 2024-12-13 | Stop reason: HOSPADM

## 2024-12-13 RX ORDER — IPRATROPIUM BROMIDE AND ALBUTEROL SULFATE 2.5; .5 MG/3ML; MG/3ML
3 SOLUTION RESPIRATORY (INHALATION)
Status: DISCONTINUED | OUTPATIENT
Start: 2024-12-13 | End: 2024-12-13 | Stop reason: HOSPADM

## 2024-12-13 RX ORDER — FENTANYL CITRATE 50 UG/ML
25 INJECTION, SOLUTION INTRAMUSCULAR; INTRAVENOUS EVERY 5 MIN PRN
OUTPATIENT
Start: 2024-12-13

## 2024-12-13 RX ORDER — LIDOCAINE HCL/PF 100 MG/5ML
SYRINGE (ML) INTRAVENOUS AS NEEDED
Status: DISCONTINUED | OUTPATIENT
Start: 2024-12-13 | End: 2024-12-13

## 2024-12-13 RX ORDER — LIDOCAINE HYDROCHLORIDE 10 MG/ML
0.1 INJECTION, SOLUTION INFILTRATION; PERINEURAL ONCE
OUTPATIENT
Start: 2024-12-13 | End: 2024-12-13

## 2024-12-13 RX ORDER — CEFAZOLIN SODIUM 2 G/100ML
2 INJECTION, SOLUTION INTRAVENOUS ONCE
Status: COMPLETED | OUTPATIENT
Start: 2024-12-13 | End: 2024-12-13

## 2024-12-13 RX ORDER — FENTANYL CITRATE 50 UG/ML
INJECTION, SOLUTION INTRAMUSCULAR; INTRAVENOUS AS NEEDED
Status: DISCONTINUED | OUTPATIENT
Start: 2024-12-13 | End: 2024-12-13

## 2024-12-13 RX ORDER — FENTANYL CITRATE 50 UG/ML
50 INJECTION, SOLUTION INTRAMUSCULAR; INTRAVENOUS EVERY 5 MIN PRN
OUTPATIENT
Start: 2024-12-13

## 2024-12-13 RX ORDER — GLYCOPYRROLATE 0.2 MG/ML
0.1 INJECTION INTRAMUSCULAR; INTRAVENOUS ONCE
Status: COMPLETED | OUTPATIENT
Start: 2024-12-13 | End: 2024-12-13

## 2024-12-13 SDOH — HEALTH STABILITY: MENTAL HEALTH: CURRENT SMOKER: 0

## 2024-12-13 ASSESSMENT — PAIN - FUNCTIONAL ASSESSMENT
PAIN_FUNCTIONAL_ASSESSMENT: 0-10
PAIN_FUNCTIONAL_ASSESSMENT: 0-10

## 2024-12-13 ASSESSMENT — PAIN SCALES - GENERAL
PAIN_LEVEL: 0
PAINLEVEL_OUTOF10: 0 - NO PAIN

## 2024-12-13 ASSESSMENT — COLUMBIA-SUICIDE SEVERITY RATING SCALE - C-SSRS
1. IN THE PAST MONTH, HAVE YOU WISHED YOU WERE DEAD OR WISHED YOU COULD GO TO SLEEP AND NOT WAKE UP?: NO
6. HAVE YOU EVER DONE ANYTHING, STARTED TO DO ANYTHING, OR PREPARED TO DO ANYTHING TO END YOUR LIFE?: NO
2. HAVE YOU ACTUALLY HAD ANY THOUGHTS OF KILLING YOURSELF?: NO

## 2024-12-13 NOTE — ANESTHESIA PREPROCEDURE EVALUATION
Patient: Kassy Lockett    Procedure Information       Date/Time: 12/13/24 0830    Procedure: BILATERAL ILIAC VEINS & INFERIOR VENA CAVA INTRAVASCULAR ULTRASOUND WITH C-ARM/ POSSIBLE INTERVENTION (Bilateral) - Intravascular ultrasound bilateral iliac veins and inferior vena cava with possible intervention    Location: [facility] / [facility]    Surgeons: Jensen Mercado, DO            Relevant Problems   Anesthesia (within normal limits)      Cardiac   (+) Benign essential HTN   (+) HTN (hypertension)      Pulmonary   (+) COPD (chronic obstructive pulmonary disease) (Multi)   (+) Exertional shortness of breath       Clinical information reviewed:   Tobacco  Allergies  Meds   Med Hx  Surg Hx   Fam Hx  Soc Hx        NPO Detail:  NPO/Void Status  Date of Last Liquid: 12/13/24  Time of Last Liquid: 0500  Date of Last Solid: 12/12/24  Time of Last Solid: 2300         Physical Exam    Airway  Mallampati: II  TM distance: >3 FB  Neck ROM: full     Cardiovascular - normal exam     Dental - normal exam     Pulmonary   (+) decreased breath sounds, wheezes     Abdominal   (+) scaphoid             Anesthesia Plan    History of general anesthesia?: yes  History of complications of general anesthesia?: no    ASA 3     general     The patient is not a current smoker.  Patient was previously instructed to abstain from smoking on day of procedure.  Patient did not smoke on day of procedure.    intravenous induction   Postoperative administration of opioids is intended.  Trial extubation is planned.  Anesthetic plan and risks discussed with patient.  Use of blood products discussed with patient who consented to blood products.    Plan discussed with CAA.

## 2024-12-13 NOTE — DISCHARGE INSTRUCTIONS
No strenuous activity over the next 48 hours.  Elevate legs above heart this evening and when able.  If any issues with bleeding swelling or pain involving your right or left groin, please contact my office.  Follow-up with me in 10 to 14 days.

## 2024-12-13 NOTE — INTERVAL H&P NOTE
H&P reviewed. The patient was examined and there are no changes to the H&P. Pain and swelling bilat LE's. 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

## 2024-12-13 NOTE — ANESTHESIA PROCEDURE NOTES
Airway  Date/Time: 12/13/2024 8:38 AM  Urgency: elective    Airway not difficult    Staffing  Performed: EDUAR   Authorized by: Alec Randhawa MD    Performed by: EDUAR Estes  Patient location during procedure: OR    Indications and Patient Condition  Indications for airway management: anesthesia  Spontaneous ventilation: present  Sedation level: deep  Preoxygenated: yes  Patient position: sniffing  MILS maintained throughout  Mask difficulty assessment: 0 - not attempted  Planned trial extubation    Final Airway Details  Final airway type: supraglottic airway      Successful airway: Size 4     Number of attempts at approach: 1  Number of other approaches attempted: 0    Additional Comments  [email or web address]

## 2024-12-13 NOTE — ANESTHESIA POSTPROCEDURE EVALUATION
Patient: Kassy Lockett    Procedure Summary       Date: 12/13/24 Room / Location: [facility] OR 12 / Virtual [facility] OR    Anesthesia Start: 0829 Anesthesia Stop: 0933    Procedure: BILATERAL ILIAC VEINS & INFERIOR VENA CAVA INTRAVASCULAR ULTRASOUND WITH C-ARM / BILATERAL ILIAC AND IVC VENAGRAM (Bilateral) Diagnosis:       Chronic venous insufficiency of lower extremity      Pain and swelling of lower extremity, unspecified laterality      (Chronic venous insufficiency of lower extremity [I87.2])      (Pain and swelling of lower extremity, unspecified laterality [M79.606, M79.89])    Surgeons: Jensen Mercado DO Responsible Provider: Alec Randhawa MD    Anesthesia Type: general ASA Status: 3            Anesthesia Type: general    Vitals Value Taken Time   /69 12/13/24 0933   Temp 36.9 12/13/24 0933   Pulse 55 12/13/24 0933   Resp 16 12/13/24 0933   SpO2 100 12/13/24 0933       Anesthesia Post Evaluation    Patient location during evaluation: PACU  Patient participation: complete - patient participated  Level of consciousness: awake and alert  Pain score: 0  Pain management: adequate  Airway patency: patent  Cardiovascular status: acceptable  Respiratory status: acceptable  Hydration status: acceptable  Postoperative Nausea and Vomiting: none        No notable events documented.

## 2024-12-14 NOTE — OP NOTE
BILATERAL ILIAC VEINS & INFERIOR VENA CAVA INTRAVASCULAR ULTRASOUND WITH C-ARM / BILATERAL ILIAC AND IVC VENAGRAM (B) Operative Note     Date: 2024  OR Location: [facility] OR    Name: Kassy Lockett, : 1955, Age: 69 y.o., MRN: [id number], Sex: female    Diagnosis  Pre-op Diagnosis      * Chronic venous insufficiency of lower extremity [I87.2]     * Pain and swelling of lower extremity, unspecified laterality [M79.606, M79.89] Post-op Diagnosis     * Chronic venous insufficiency of lower extremity [I87.2]     * Pain and swelling of lower extremity, unspecified laterality [M79.606, M79.89]     Procedures  BILATERAL ILIAC VEINS & INFERIOR VENA CAVA INTRAVASCULAR ULTRASOUND WITH C-ARM / BILATERAL ILIAC AND IVC VENAGRAM  [id number] - [place] INTRAVASCULAR US NONCORONARY RS&I INTIAL VESSEL      Surgeons      * Jensen Mercado - Primary    Resident/Fellow/Other Assistant:  Surgeons and Role:  * No surgeons found with a matching role *    Staff:   Circulator: Bianca  Surgical Assistant: Golden York Person: Юлия Watson Circulator: Esther    Anesthesia Staff: Anesthesiologist: Alec Randhawa MD  C-AA: EDUAR Estes    Procedure Summary  Anesthesia: General  ASA: III  Estimated Blood Loss: 5mL  Intra-op Medications:   Administrations occurring from 0830 to 1110 on 24:   Medication Name Total Dose   lidocaine (Xylocaine) 10 mg/mL (1 %) injection 4 mL   iohexol (OMNIPaque) 350 mg iodine/mL solution 10 mL   ceFAZolin (Ancef) 2 g in dextrose (iso)  mL 2 g   fentaNYL (Sublimaze) injection 50 mcg/mL 50 mcg   LR bolus Cannot be calculated   lidocaine (cardiac) injection 2% prefilled syringe 100 mg   midazolam (Versed) injection 1 mg/mL 2 mg   ondansetron (Zofran) 2 mg/mL injection 4 mg   propofol (Diprivan) injection 10 mg/mL 150 mg              Anesthesia Record               Intraprocedure I/O Totals          Intake    LR bolus 300.00 mL    Total Intake 300 mL       Output    Est. Blood Loss 5 mL    Total  Output 5 mL       Net    Net Volume 295 mL          Specimen: No specimens collected              Drains and/or Catheters: * None in log *    Tourniquet Times:         Implants:     Findings:     Indications: Kassy Lockett is an 69 y.o. female who is having surgery for Chronic venous insufficiency of lower extremity [I87.2]  Pain and swelling of lower extremity, unspecified laterality [M79.606, M79.89].     The patient was seen in the preoperative area. The risks, benefits, complications, treatment options, non-operative alternatives, expected recovery and outcomes were discussed with the patient. The possibilities of reaction to medication, pulmonary aspiration, injury to surrounding structures, bleeding, recurrent infection, the need for additional procedures, failure to diagnose a condition, and creating a complication requiring transfusion or operation were discussed with the patient. The patient concurred with the proposed plan, giving informed consent.  The site of surgery was properly noted/marked if necessary per policy. The patient has been actively warmed in preoperative area. Preoperative antibiotics are not indicated. Venous thrombosis prophylaxis are not indicated.    Procedure Details: After the procedure was fully explained to the patient after adequate consent form was signed, the patient had an IV and was taken to the operating room and placed in the supine position and was given a general acetic agent and had LMA for ventilation.  The entire abdomen pelvis and lower extremities down to the mid thigh was cleaned prepped in usual sterile fashion.  Using ultrasound guidance, the skin subcutaneous tissue over the right common femoral vein was anesthetized.  An angio access needle under ultrasound guidance was then used to cannulate the common femoral vein near the junction of the superficial femoral vein.  A guidewire was then advanced through the right iliac system and into the inferior vena cava.   This was seen under fluoroscopy.  A small transverse incision was made at the exit of the guidewire.  A dilator and sheath was advanced over the guidewire and into the vein.  The dilator was removed and the sheath was flushed with heparinized saline.  The exact same process was performed involving the left femoral region.  Starting with the left femoral region, a venogram was then performed involving the left iliac system.  The contrast entered the inferior vena cava and reflux into the bilateral iliac system was identified.  The sheath was then flushed with heparinized saline.  A venogram involving the right femoral region and iliac system was also performed and again showed reflux from the inferior vena cava into both iliac system.  At this time, an intravascular ultrasound catheter was advanced through the left femoral sheath and slowly advanced to the renal veins.  A recording was performed while slowly pulling the ultrasound catheter back through the inferior vena cava and through the entire left iliac system.  There were no significant areas of venous compression identified.  At this time, the intravascular ultrasound catheter was advanced through the right femoral sheath and slowly advanced into the inferior vena cava.  A recording was then done while slowly pulling the catheter back through the entire right iliac venous system.  Again, there were no significant venous compression noted.  At this time, I was satisfied with the results.  The guidewire involving the left and right groins were then removed.  Both sheaths were removed and pressure was held over the right and left femoral stick site for 15 minutes.  There was no significant bleeding or hematoma identified.  The small incisions were then irrigated with antibiotic solution.  They were then closed using inverted interrupted suture of 4-0 Vicryl.  Dermabond was applied to seal the incisions.  After the glue dried, Telfa Tegaderm was applied.  The  patient tolerated the procedure well.  No complications noted.  Instrument count needle count sponge counts correct.  Thank you very much this ends dictation  Complications:  None; patient tolerated the procedure well.    Disposition: PACU - hemodynamically stable.  Condition: stable         Task Performed by RNFA or Surgical Assistant:            Additional Details:     Attending Attestation: I was present and scrubbed for the entire procedure.    Jensen Mercado  Phone Number: [phone]

## 2024-12-23 ENCOUNTER — APPOINTMENT (OUTPATIENT)
Dept: VASCULAR SURGERY | Facility: CLINIC | Age: 69
End: 2024-12-23
Payer: MEDICARE

## 2024-12-23 VITALS
OXYGEN SATURATION: 93 % | SYSTOLIC BLOOD PRESSURE: 102 MMHG | HEART RATE: 58 BPM | DIASTOLIC BLOOD PRESSURE: 60 MMHG | WEIGHT: 150 LBS | BODY MASS INDEX: 21 KG/M2 | HEIGHT: 71 IN

## 2024-12-23 DIAGNOSIS — M79.89 LEG SWELLING: ICD-10-CM

## 2024-12-23 DIAGNOSIS — M79.89 PAIN AND SWELLING OF LOWER EXTREMITY, UNSPECIFIED LATERALITY: Primary | ICD-10-CM

## 2024-12-23 DIAGNOSIS — G25.81 RESTLESS LEG SYNDROME: ICD-10-CM

## 2024-12-23 DIAGNOSIS — M79.606 PAIN AND SWELLING OF LOWER EXTREMITY, UNSPECIFIED LATERALITY: Primary | ICD-10-CM

## 2024-12-23 PROCEDURE — 1159F MED LIST DOCD IN RCRD: CPT | Performed by: SURGERY

## 2024-12-23 PROCEDURE — 99024 POSTOP FOLLOW-UP VISIT: CPT | Performed by: SURGERY

## 2024-12-23 PROCEDURE — 1123F ACP DISCUSS/DSCN MKR DOCD: CPT | Performed by: SURGERY

## 2024-12-23 PROCEDURE — 1036F TOBACCO NON-USER: CPT | Performed by: SURGERY

## 2024-12-23 PROCEDURE — 3074F SYST BP LT 130 MM HG: CPT | Performed by: SURGERY

## 2024-12-23 PROCEDURE — 3008F BODY MASS INDEX DOCD: CPT | Performed by: SURGERY

## 2024-12-23 PROCEDURE — 1160F RVW MEDS BY RX/DR IN RCRD: CPT | Performed by: SURGERY

## 2024-12-23 PROCEDURE — 3078F DIAST BP <80 MM HG: CPT | Performed by: SURGERY

## 2024-12-24 NOTE — PROGRESS NOTES
"Kassy Lockett is a 69 y.o. female     Subjective   This patient presents today for follow-up of her venous issues of her lower extremities.  I did do intravascular ultrasound of her right and left iliac system and her inferior vena cava.  The intervention did not show any May Thurner syndrome.  She states that her legs are okay 1 day and then bad the next day.  She states that her legs became significantly swollen and painful after a bout of sepsis and pneumonia in July 2023.  She has also lost some weight.  She is now 5 foot 11 and 150 pounds.  She quit smoking in 2020.  She denies any fever chills nausea vomiting or headache.         Objective     Vitals:    12/23/24 0900   BP: 102/60   Pulse: 58   SpO2: 93%      Physical Exam  This patient is alert and oriented x3 her head is normocephalic neck is soft and supple without palpable lymph nodes.  Heart is regular rate.  Lungs are clear.  Abdomen soft with positive bowel sounds.  There is no pain on palpation.  Upper and lower extremities have adequate range of motion with palpable brachial radial femoral and popliteal pulses.  Skin turgor is somewhat decreased in both lower legs.  Psychologically the patient appears to be acting appropriately.  Her lower extremity swelling seems less than previous visits.  Blood pressure 102/60, pulse 58, height 1.803 m (5' 11\"), weight 68 kg (150 lb), SpO2 93%.            Patient Active Problem List    Diagnosis Date Noted    Chronic venous insufficiency of lower extremity 11/07/2024    Pain and swelling of lower extremity 11/07/2024    Venous stasis dermatitis of both lower extremities 08/28/2024    Leg swelling 08/28/2024    HTN (hypertension) 07/26/2023    Benign essential HTN 06/02/2023    COPD (chronic obstructive pulmonary disease) (Multi) 06/02/2023    Exertional shortness of breath 06/02/2023    Restless leg syndrome 06/02/2023    Bilateral pseudophakia 04/30/2019          Current Outpatient Medications:     albuterol 90 " mcg/actuation inhaler, USE 1 TO 2 INHALATIONS BY  MOUTH EVERY 4 TO 6 HOURS AS NEEDED, Disp: 25.5 g, Rfl: 6    alendronate (Fosamax) 70 mg tablet, Take 1 tablet (70 mg) by mouth every 7 days. Take in the morning with a full glass of water, on an empty stomach, and do not take anything else by mouth or lie down for the next 30 min., Disp: 4 tablet, Rfl: 11    atorvastatin (Lipitor) 40 mg tablet, Take 1 tablet (40 mg) by mouth once daily., Disp: 100 tablet, Rfl: 3    azithromycin (Zithromax) 500 mg tablet, Take 0.5 tablets (250 mg) by mouth 3 times a week., Disp: , Rfl:     Comp-Air Nebulizer Compressor device, use as directed, Disp: , Rfl:     furosemide (Lasix) 20 mg tablet, Take 1 tablet (20 mg) by mouth once daily as needed (edema)., Disp: 45 tablet, Rfl: 3    gabapentin (Neurontin) 300 mg capsule, TAKE 1 CAPSULE BY MOUTH 3 TIMES  DAILY, Disp: 240 capsule, Rfl: 3    ipratropium-albuteroL (Duo-Neb) 0.5-2.5 mg/3 mL nebulizer solution, Take 3 mL by nebulization 4 times a day as needed for wheezing or shortness of breath., Disp: 360 mL, Rfl: 1    losartan-hydrochlorothiazide (Hyzaar) 100-25 mg tablet, TAKE 1 TABLET BY MOUTH ONCE  DAILY, Disp: 100 tablet, Rfl: 2    metoprolol succinate XL (Toprol-XL) 50 mg 24 hr tablet, TAKE 1 TABLET BY MOUTH ONCE  DAILY, Disp: 100 tablet, Rfl: 2    oxygen (O2) gas therapy, Inhale 1 L/min if needed (at home)., Disp: , Rfl:     pramipexole (Mirapex) 0.5 mg tablet, Take 1 tablet (0.5 mg) by mouth 2 times a day., Disp: 180 tablet, Rfl: 3    traZODone (Desyrel) 50 mg tablet, Take 1 tablet (50 mg) by mouth as needed at bedtime for sleep. (Patient not taking: Reported on 12/13/2024), Disp: 30 tablet, Rfl: 5    Trelegy Ellipta 200-62.5-25 mcg blister with device, INHALE 1 INHALATION BY MOUTH  ONCE DAILY, Disp: 180 each, Rfl: 3     Lab Results   Component Value Date    WBC 7.2 10/08/2024    HGB 15.1 10/08/2024    HCT 46.3 (H) 10/08/2024     10/08/2024    CHOL 205 (H) 10/08/2024    TRIG  70 10/08/2024    HDL 66.0 10/08/2024    ALT 11 10/08/2024    AST 13 10/08/2024     10/08/2024    K 3.9 10/08/2024     10/08/2024    CREATININE 0.56 10/08/2024    BUN 8 10/08/2024    CO2 28 10/08/2024    TSH 1.24 10/08/2024    HGBA1C 5.5 05/22/2024       FL less than 1 hour    Result Date: 12/13/2024  These images are not reportable by radiology and will not be interpreted by  Radiologists.                Assessment/Plan   Assessment & Plan      This patient presents status post bilateral iliac and IVC intravascular ultrasound for evaluation for possible May Thurner syndrome.  No stents were placed.  She states that her legs are okay 1 day and then bad the next.  She quit smoking in 2020.  She has lost a little bit of weight.  She is 5 foot 11 and weighs 150 pounds.  She states that her legs became very bad after developing sepsis and pneumonia back in July 2023.  This may very well be a viral type of syndrome.  I do feel that her legs are better than her last few visits.  She does have less swelling today and her compartments of her calves are easily movable.  She does state that she is unable to wear compression stockings because they cause her significant pain.  At this time, I would like her to focus on elevating her legs above her heart whenever she is resting.  She does stay relatively active during the day.  I would like her to follow-up in 3 months      Jensen Mercado, DO

## 2025-01-08 ENCOUNTER — TELEPHONE (OUTPATIENT)
Dept: PRIMARY CARE | Facility: CLINIC | Age: 70
End: 2025-01-08
Payer: MEDICARE

## 2025-01-08 NOTE — TELEPHONE ENCOUNTER
----- Message from Anabela Beckham sent at 1/8/2025 11:30 AM EST -----  3 polyps removed.  4 AVMs cauterized.   Follow up with Dr Rucker on bx results and repeat colonoscopy in 5 years

## 2025-01-30 ENCOUNTER — OFFICE VISIT (OUTPATIENT)
Dept: PRIMARY CARE | Facility: CLINIC | Age: 70
End: 2025-01-30
Payer: MEDICARE

## 2025-01-30 ENCOUNTER — HOSPITAL ENCOUNTER (OUTPATIENT)
Dept: RADIOLOGY | Facility: EXTERNAL LOCATION | Age: 70
Discharge: HOME | End: 2025-01-30

## 2025-01-30 VITALS
SYSTOLIC BLOOD PRESSURE: 116 MMHG | OXYGEN SATURATION: 97 % | BODY MASS INDEX: 20.72 KG/M2 | HEIGHT: 71 IN | WEIGHT: 148 LBS | HEART RATE: 62 BPM | DIASTOLIC BLOOD PRESSURE: 72 MMHG

## 2025-01-30 DIAGNOSIS — M81.0 OSTEOPOROSIS WITHOUT CURRENT PATHOLOGICAL FRACTURE, UNSPECIFIED OSTEOPOROSIS TYPE: Primary | ICD-10-CM

## 2025-01-30 DIAGNOSIS — M67.40 GANGLION CYST: ICD-10-CM

## 2025-01-30 DIAGNOSIS — F17.211 CIGARETTE NICOTINE DEPENDENCE IN REMISSION: ICD-10-CM

## 2025-01-30 DIAGNOSIS — R73.9 HYPERGLYCEMIA: ICD-10-CM

## 2025-01-30 PROCEDURE — 1123F ACP DISCUSS/DSCN MKR DOCD: CPT | Performed by: STUDENT IN AN ORGANIZED HEALTH CARE EDUCATION/TRAINING PROGRAM

## 2025-01-30 PROCEDURE — G0296 VISIT TO DETERM LDCT ELIG: HCPCS | Performed by: STUDENT IN AN ORGANIZED HEALTH CARE EDUCATION/TRAINING PROGRAM

## 2025-01-30 PROCEDURE — 1160F RVW MEDS BY RX/DR IN RCRD: CPT | Performed by: STUDENT IN AN ORGANIZED HEALTH CARE EDUCATION/TRAINING PROGRAM

## 2025-01-30 PROCEDURE — 3008F BODY MASS INDEX DOCD: CPT | Performed by: STUDENT IN AN ORGANIZED HEALTH CARE EDUCATION/TRAINING PROGRAM

## 2025-01-30 PROCEDURE — 3078F DIAST BP <80 MM HG: CPT | Performed by: STUDENT IN AN ORGANIZED HEALTH CARE EDUCATION/TRAINING PROGRAM

## 2025-01-30 PROCEDURE — 1036F TOBACCO NON-USER: CPT | Performed by: STUDENT IN AN ORGANIZED HEALTH CARE EDUCATION/TRAINING PROGRAM

## 2025-01-30 PROCEDURE — G2211 COMPLEX E/M VISIT ADD ON: HCPCS | Performed by: STUDENT IN AN ORGANIZED HEALTH CARE EDUCATION/TRAINING PROGRAM

## 2025-01-30 PROCEDURE — 3074F SYST BP LT 130 MM HG: CPT | Performed by: STUDENT IN AN ORGANIZED HEALTH CARE EDUCATION/TRAINING PROGRAM

## 2025-01-30 PROCEDURE — 1159F MED LIST DOCD IN RCRD: CPT | Performed by: STUDENT IN AN ORGANIZED HEALTH CARE EDUCATION/TRAINING PROGRAM

## 2025-01-30 PROCEDURE — 99213 OFFICE O/P EST LOW 20 MIN: CPT | Performed by: STUDENT IN AN ORGANIZED HEALTH CARE EDUCATION/TRAINING PROGRAM

## 2025-01-30 RX ORDER — ALENDRONATE SODIUM 70 MG/1
70 TABLET ORAL
Qty: 12 TABLET | Refills: 0 | Status: SHIPPED | OUTPATIENT
Start: 2025-01-30 | End: 2025-04-24

## 2025-01-30 NOTE — PROGRESS NOTES
Subjective   Patient ID: Kassy Lockett is a 69 y.o. female who presents for the following    Assessment/Plan   Preventative Medicine (Done in Oct 2024)  Medicare Wellness October 2024  -UTD on vaccination. Flu shot done in 2024  -Colon ca screening: Done in Jan 2025. 2 cecal AVM cauterized. 7 polyps removed. Repeat in 5 years.   -Lung Ca screening in Feb 2024 shows 3mm nodule. Repeat order given   -MMG and Breast US in Sept 2024: probably benign L hypoechoic nodule and L axillary tail node. BIRADS 3. Repeat in 1 year  -DEXA scan completed in Oct 2024  -ACP discussed. Has a living will/advanced directive but has her  listed as HCPOA.  has passed away and patient needs to change her HCPOA. Currently will be using her sister in law as HCPOA. Code Status discussed, but patient is currently not sure about DNR vs Full Code. Will remain full code and will put her stipulations in Advanced Directive.     Hyperglycemia  -Seen on last labs  -A1c in May 2024 was 5.44  -Check A1c today      Osteoporosis   -Seen on DEXA scan from October 2024  -Calcium: 9.7, Vitamin D 45   PLAN  -Continue fosamax 70mg PO weekly   -Check vit D and calcium  -Side effect profile discussed. Risk of Jaw osteonecrosis, Atypical fx, esophagitis discussed.     Ganglion cyst on L wrist  -Recommend wrist brace throughout the day  -Supportive care with anti-inflammatories such as motrin/aleve  -If worsens will need to send referral to specialist for drainage. Referral given to Dr Mayo.      HTN  -BP is well controlled on current meds  -Losartan/hydrochlorothiazide, metoprolol  -Low salt/low carb diet recommended     COPD  -Continue Trelegy  -Continue PRN Albuterol   -Follows with pulm (Dr Levi)      RLS  -Continue pramipexole      Vitamin D Insufficiency - resolved  -Vitamin D level is 45  -Continue multivitamins with Vitamin D     HLD   ASCVD Risk  -ASCVD 8.1%  PLAN  -Continue Lipitor 40mg PO daily rx   -Side effects discussed and patient  "understands.   -Low fat, low carb diet advised  -Increase exercise as tolerated in the setting of COPD      Insomnia  -Decrease caffeine intake. Currently drinking 2 pots of coffee daily and 2 Pepsi's daily.   -Sleep hygiene  Plan:  -Previously tried trazodone however did not help  -Continue melatonin as needed     CVS with Dermatitis  -Continue follow up with Dr Mercado  -Elevate legs at night  -Try compression stockings or compression wrapping.     HPI  69-year-old female past medical history of COPD, hypertension, restless leg syndrome presents for follow-up of osteoporosis     Recently started on fosamax for osteoporosis seen on bone scan.  Tolerating fosamax. Does reports some soreness in bilateral shoulders but no recent trauma or falls. No reported side effects when discussed with patient.    Lump on left wrist: Started on left wrist over the past couple weeks, no pain in palpation. However reports pain when bending hand. Does not use wrist brace. No erythema or noticeable wound.     Had colonoscopy by Dr. Rucker 1/7/25. Due for repeat in 5 years (2030)  Tolerating all home medications appropriately.      Denies fevers, chills, weight loss, lightheadedness, dizziness, vision changes, sore throat, runny nose, CP,  cough, palpitations, n/v, abd pain, black/bloody stools, arthralgias, or new numbness/weakness/tingling in arms/legs/face.       PMH: COPD, Neuropathy, HTN, RLS   Surgeries: Cervical cancer with cervix removal, GB removal      Fhx: significant cancer hx. Mother had lung ca, one sister with myelofibrosis, one sister with lung ca     Personal Hx  -  -1 child      Social Hx  T: quit 4 years ago  A: denies  D: denies     Visit Vitals  /72   Pulse 62   Ht 1.803 m (5' 11\")   Wt 67.1 kg (148 lb)   SpO2 97%   BMI 20.64 kg/m²   OB Status Postmenopausal   Smoking Status Former   BSA 1.83 m²     PHYSICAL EXAM   General: well appearing, alert, no acute distress  Skin: Warm and dry  ENT: NC; PERRL; " EOMI; clear sclera; membranes pink and moist; Fair dentition  Head/Neck: NC; supple, non-rigid, no JVD  CV: regular rate and rhythm, no murmurs  RESP: CTA bilaterally, normal chest expansion, no resp distress  Abd: soft, nontender, nondistended  Neuro: alert and oriented x3, speech appropriate, cranial nerves grossly intact, motor/sensation wnl  Extremities: Wrist with large circular swelling on dorsal forearmm no erythema or wound noted. no peripheral edema appreciated, cap refill <2s  Psych: Appropriate mood and affect     REVIEW OF SYSTEMS       No Known Allergies    Current Outpatient Medications   Medication Sig Dispense Refill    albuterol 90 mcg/actuation inhaler USE 1 TO 2 INHALATIONS BY  MOUTH EVERY 4 TO 6 HOURS AS NEEDED 25.5 g 6    alendronate (Fosamax) 70 mg tablet Take 1 tablet (70 mg) by mouth every 7 days. Take in the morning with a full glass of water, on an empty stomach, and do not take anything else by mouth or lie down for the next 30 min. 4 tablet 11    atorvastatin (Lipitor) 40 mg tablet Take 1 tablet (40 mg) by mouth once daily. 100 tablet 3    azithromycin (Zithromax) 500 mg tablet Take 0.5 tablets (250 mg) by mouth 3 times a week.      Comp-Air Nebulizer Compressor device use as directed      furosemide (Lasix) 20 mg tablet Take 1 tablet (20 mg) by mouth once daily as needed (edema). 45 tablet 3    gabapentin (Neurontin) 300 mg capsule TAKE 1 CAPSULE BY MOUTH 3 TIMES  DAILY 240 capsule 3    losartan-hydrochlorothiazide (Hyzaar) 100-25 mg tablet TAKE 1 TABLET BY MOUTH ONCE  DAILY 100 tablet 2    metoprolol succinate XL (Toprol-XL) 50 mg 24 hr tablet TAKE 1 TABLET BY MOUTH ONCE  DAILY 100 tablet 2    oxygen (O2) gas therapy Inhale 1 L/min if needed (at home).      pramipexole (Mirapex) 0.5 mg tablet Take 1 tablet (0.5 mg) by mouth 2 times a day. 180 tablet 3    Trelegy Ellipta 200-62.5-25 mcg blister with device INHALE 1 INHALATION BY MOUTH  ONCE DAILY 180 each 3    ipratropium-albuteroL  (Duo-Neb) 0.5-2.5 mg/3 mL nebulizer solution Take 3 mL by nebulization 4 times a day as needed for wheezing or shortness of breath. 360 mL 1    traZODone (Desyrel) 50 mg tablet Take 1 tablet (50 mg) by mouth as needed at bedtime for sleep. (Patient not taking: Reported on 1/30/2025) 30 tablet 5     No current facility-administered medications for this visit.       Objective     Pre-Admission Testing on 12/04/2024   Component Date Value Ref Range Status    Staph/MRSA Screen Culture 12/04/2024 No Staphylococcus aureus isolated   Final   Lab on 10/08/2024   Component Date Value Ref Range Status    WBC 10/08/2024 7.2  4.4 - 11.3 x10*3/uL Final    nRBC 10/08/2024 0.0  0.0 - 0.0 /100 WBCs Final    RBC 10/08/2024 4.62  4.00 - 5.20 x10*6/uL Final    Hemoglobin 10/08/2024 15.1  12.0 - 16.0 g/dL Final    Hematocrit 10/08/2024 46.3 (H)  36.0 - 46.0 % Final    MCV 10/08/2024 100  80 - 100 fL Final    MCH 10/08/2024 32.7  26.0 - 34.0 pg Final    MCHC 10/08/2024 32.6  32.0 - 36.0 g/dL Final    RDW 10/08/2024 13.2  11.5 - 14.5 % Final    Platelets 10/08/2024 404  150 - 450 x10*3/uL Final    Neutrophils % 10/08/2024 69.2  40.0 - 80.0 % Final    Immature Granulocytes %, Automated 10/08/2024 0.3  0.0 - 0.9 % Final    Lymphocytes % 10/08/2024 22.3  13.0 - 44.0 % Final    Monocytes % 10/08/2024 5.4  2.0 - 10.0 % Final    Eosinophils % 10/08/2024 1.5  0.0 - 6.0 % Final    Basophils % 10/08/2024 1.3  0.0 - 2.0 % Final    Neutrophils Absolute 10/08/2024 4.98  1.20 - 7.70 x10*3/uL Final    Immature Granulocytes Absolute, Au* 10/08/2024 0.02  0.00 - 0.70 x10*3/uL Final    Lymphocytes Absolute 10/08/2024 1.60  1.20 - 4.80 x10*3/uL Final    Monocytes Absolute 10/08/2024 0.39  0.10 - 1.00 x10*3/uL Final    Eosinophils Absolute 10/08/2024 0.11  0.00 - 0.70 x10*3/uL Final    Basophils Absolute 10/08/2024 0.09  0.00 - 0.10 x10*3/uL Final    Glucose 10/08/2024 109 (H)  74 - 99 mg/dL Final    Sodium 10/08/2024 138  136 - 145 mmol/L Final     Potassium 10/08/2024 3.9  3.5 - 5.3 mmol/L Final    Chloride 10/08/2024 102  98 - 107 mmol/L Final    Bicarbonate 10/08/2024 28  21 - 32 mmol/L Final    Anion Gap 10/08/2024 12  10 - 20 mmol/L Final    Urea Nitrogen 10/08/2024 8  6 - 23 mg/dL Final    Creatinine 10/08/2024 0.56  0.50 - 1.05 mg/dL Final    eGFR 10/08/2024 >90  >60 mL/min/1.73m*2 Final    Calcium 10/08/2024 9.7  8.6 - 10.6 mg/dL Final    Albumin 10/08/2024 4.2  3.4 - 5.0 g/dL Final    Alkaline Phosphatase 10/08/2024 84  33 - 136 U/L Final    Total Protein 10/08/2024 6.3 (L)  6.4 - 8.2 g/dL Final    AST 10/08/2024 13  9 - 39 U/L Final    Bilirubin, Total 10/08/2024 0.7  0.0 - 1.2 mg/dL Final    ALT 10/08/2024 11  7 - 45 U/L Final    Cholesterol 10/08/2024 205 (H)  0 - 199 mg/dL Final    HDL-Cholesterol 10/08/2024 66.0  mg/dL Final    Cholesterol/HDL Ratio 10/08/2024 3.1   Final    LDL Calculated 10/08/2024 125 (H)  <=99 mg/dL Final    VLDL 10/08/2024 14  0 - 40 mg/dL Final    Triglycerides 10/08/2024 70  0 - 149 mg/dL Final    Non HDL Cholesterol 10/08/2024 139  0 - 149 mg/dL Final    Thyroid Stimulating Hormone 10/08/2024 1.24  0.44 - 3.98 mIU/L Final    Vitamin D, 25-Hydroxy, Total 10/08/2024 45  30 - 100 ng/mL Final       Radiology: Reviewed imaging in powerchart.  XR chest 2 views    Result Date: 1/2/2025  PROCEDURE:  XR CHEST 2 VIEWS HISTORY:  COUGH COMPARISON: Chest x-ray, 8/13/2024 FINDINGS: The lungs are hyperaerated with upper lobe vascular attenuation and biapical scarring, unchanged.  There is no focal infiltrate, pleural effusion or pneumothorax. The cardiomediastinal contours are stable with atherosclerotic aortic calcification. There are no acute bony or soft tissue abnormalities. IMPRESSION: No acute cardiopulmonary process. Overall stable chest with emphysematous changes in the lungs. Electronically signed by:  Robi Burns MD  01/02/2025 02:19 PM EST RP Workstation: [id number] Technologist:  NFF Dictated By:   ROBI BURNS MD  Signed By:     SIENNA WEI MD Signed Out:    25 14:19:59      Family History   Problem Relation Name Age of Onset    COPD Mother Sylvie     Lung cancer Mother Sylvie     Breast cancer Mother Sylvie     Heart attack Father Gopal     Heart disease Father Gopal     Cancer Sister      Cancer Sister Kelley     Cancer Sister Anju     COPD Brother Star     Diabetes Mother's Sister Kita     Breast cancer Mother's Sister Kita     Breast cancer Mother's Sister      Diabetes Mother's Brother Sebastian     Diabetes Mother's Brother Gregory      Social History     Socioeconomic History    Marital status:    Tobacco Use    Smoking status: Former     Current packs/day: 0.00     Types: Cigarettes     Quit date: 2020     Years since quittin.7    Smokeless tobacco: Never    Tobacco comments:     I smoked for 50 years and quit 3 years ago.   Substance and Sexual Activity    Alcohol use: Yes     Comment: I drink beer maybe once a month.    Drug use: Never    Sexual activity: Not Currently     Partners: Male     Birth control/protection: None     Social Drivers of Health     Financial Resource Strain: Low Risk  (10/24/2022)    Received from [facility]    Overall Financial Resource Strain (CARDIA)     Difficulty of Paying Living Expenses: Not hard at all   Food Insecurity: No Food Insecurity (10/24/2022)    Received from [facility]    Hunger Vital Sign     Worried About Running Out of Food in the Last Year: Never true     Ran Out of Food in the Last Year: Never true   Transportation Needs: No Transportation Needs (10/24/2022)    Received from [facility]    PRAPARE - Transportation     Lack of Transportation (Medical): No     Lack of Transportation (Non-Medical): No   Physical Activity: Insufficiently Active (10/24/2022)    Received from [facility]    Exercise Vital Sign     Days of Exercise per Week: 2 days     Minutes of Exercise per Session: 40 min    Stress: No Stress Concern Present (10/24/2022)    Received from [facility]    [employer] - Occupational Stress Questionnaire     Feeling of Stress : Not at all   Social Connections: Moderately Isolated (10/24/2022)    Received from [facility]    Social Connection and Isolation Panel [NHANES]     Frequency of Communication with Friends and Family: More than three times a week     Frequency of Social Gatherings with Friends and Family: Three times a week     Attends Gnosticist Services: 1 to 4 times per year     Active Member of Clubs or Organizations: No     Attends Club or Organization Meetings: Never     Marital Status:    Intimate Partner Violence: Not At Risk (10/24/2022)    Received from [facility]    Humiliation, Afraid, Rape, and Kick questionnaire     Fear of Current or Ex-Partner: No     Emotionally Abused: No     Physically Abused: No     Sexually Abused: No   Housing Stability: Low Risk  (10/24/2022)    Received from [facility]    Housing Stability Vital Sign     Unable to Pay for Housing in the Last Year: No     Number of Places Lived in the Last Year: 2     Unstable Housing in the Last Year: No     Past Medical History:   Diagnosis Date    COPD (chronic obstructive pulmonary disease) (Multi)     Malignant neoplasm of cervix uteri, unspecified     Primary cervical cancer    Oxygen dependent     PRN 2 Liters nasal cannula - home use    Personal history of other diseases of the respiratory system 02/21/2019    History of bronchitis     Past Surgical History:   Procedure Laterality Date    CHOLECYSTECTOMY  01/22/2018    Cholecystectomy    OTHER SURGICAL HISTORY  08/05/2016    Cervical Surgery (Gyn)       Charting was completed using voice recognition technology and may include unintended errors.

## 2025-01-31 ENCOUNTER — TELEPHONE (OUTPATIENT)
Dept: PRIMARY CARE | Facility: CLINIC | Age: 70
End: 2025-01-31
Payer: MEDICARE

## 2025-01-31 LAB
25(OH)D3+25(OH)D2 SERPL-MCNC: 41 NG/ML (ref 30–100)
CALCIUM SERPL-MCNC: 9.7 MG/DL (ref 8.6–10.4)
EST. AVERAGE GLUCOSE BLD GHB EST-MCNC: 126 MG/DL
EST. AVERAGE GLUCOSE BLD GHB EST-SCNC: 7 MMOL/L
HBA1C MFR BLD: 6 % OF TOTAL HGB

## 2025-01-31 NOTE — TELEPHONE ENCOUNTER
----- Message from Anabela Beckham sent at 1/31/2025  1:51 PM EST -----  A1c 6.0 Pre-diabetic.     Can set up virtual to discuss medication or if she would like to try diet/exercise for 3-4 months and recheck A1c we can do it.

## 2025-02-03 NOTE — TELEPHONE ENCOUNTER
Spoke to patient informed her of below information. Patient stated she eats very healthy and exercises so she agreed to set up a phone call to discuss next steps with Dr. Beckham.

## 2025-02-04 ENCOUNTER — TELEMEDICINE (OUTPATIENT)
Dept: PRIMARY CARE | Facility: CLINIC | Age: 70
End: 2025-02-04
Payer: MEDICARE

## 2025-02-04 DIAGNOSIS — R73.03 PREDIABETES: Primary | ICD-10-CM

## 2025-02-04 DIAGNOSIS — J44.9 CHRONIC OBSTRUCTIVE PULMONARY DISEASE, UNSPECIFIED COPD TYPE (MULTI): ICD-10-CM

## 2025-02-04 NOTE — PROGRESS NOTES
Subjective   Patient ID: Kassy Lockett is a 69 y.o. female who presents for the following    Assessment/Plan   Preventative Medicine (Done in Oct 2024)  Medicare Wellness October 2024  -UTD on vaccination. Flu shot done in 2024  -Colon ca screening: Done in Jan 2025. 2 cecal AVM cauterized. 7 polyps removed. Repeat in 5 years.   -Lung Ca screening in Feb 2024 shows 3mm nodule. Repeat Feb 10, 2025  -MMG and Breast US in Sept 2024: probably benign L hypoechoic nodule and L axillary tail node. BIRADS 3. Repeat in 1 year  -DEXA scan completed in Oct 2024  -ACP discussed. Has a living will/advanced directive but has her  listed as HCPOA.  has passed away and patient needs to change her HCPOA. Currently will be using her sister in law as HCPOA. Code Status discussed, but patient is currently not sure about DNR vs Full Code. Will remain full code and will put her stipulations in Advanced Directive.     Hyperglycemia  Pre-Diabetes   -Seen on last labs  -A1c in May 2024 was 5.44  -A1c in Jan 2025 was 6.0   -Discussed medications vs diet control. Will try to watch carbohydrate intake.   -Has already lost 30 pounds with improved diet over the past 6-7 months.   -RTC in 3-4 months for repeat A1c      Osteoporosis   -Seen on DEXA scan from October 2024  -Calcium: 9.7, Vitamin D 45   PLAN  -Continue fosamax 70mg PO weekly   -Check vit D and calcium  -Side effect profile discussed. Risk of Jaw osteonecrosis, Atypical fx, esophagitis discussed.     Ganglion cyst on L wrist  -Recommend wrist brace throughout the day  -Supportive care with anti-inflammatories such as motrin/aleve  -If worsens will need to send referral to specialist for drainage. Referral given to Dr Mayo.      HTN  -BP is well controlled on current meds  -Losartan/hydrochlorothiazide, metoprolol  -Low salt/low carb diet recommended     COPD  -Continue Trelegy  -Continue PRN Albuterol   -Follows with pulm (Dr Levi)   -Handicap placard given for COPD  as she gets very SOB walking far distances.      RLS  -Continue pramipexole      Vitamin D Insufficiency - resolved  -Vitamin D level is 45  -Continue multivitamins with Vitamin D     HLD   ASCVD Risk  -ASCVD 8.1%  PLAN  -Continue Lipitor 40mg PO daily rx   -Side effects discussed and patient understands.   -Low fat, low carb diet advised  -Increase exercise as tolerated in the setting of COPD      Insomnia  -Decrease caffeine intake. Currently drinking 2 pots of coffee daily and 2 Pepsi's daily.   -Sleep hygiene  Plan:  -Previously tried trazodone however did not help  -Continue melatonin as needed     CVS with Dermatitis  -Continue follow up with Dr Mercado  -Elevate legs at night  -Try compression stockings or compression wrapping.     9 minutes spent on telephone encounter.       HPI    Virtual or Telephone Consent    A telephone visit (audio only) between the patient (at the originating site) and the provider (at the distant site) was utilized to provide this telehealth service.   Verbal consent was requested and obtained from Kassy Lockett on this date, 02/04/25 for a telehealth visit.     69-year-old female past medical history of COPD, hypertension, restless leg syndrome presents for follow-up of osteoporosis    Had elevated A1c at last visit and wanted to discuss diet vs medication control. Has been working on her diet and is decreasing carbohydrate intake.     Also requesting handicap placard for COPD. States that prolonged walking in the cold flareups her COPD.      Doing well on fosamax.     Had colonoscopy by Dr. Rucker 1/7/25. Due for repeat in 5 years (2030)  Tolerating all home medications appropriately.      Denies fevers, chills, weight loss, lightheadedness, dizziness, vision changes, sore throat, runny nose, CP,  cough, palpitations, n/v, abd pain, black/bloody stools, arthralgias, or new numbness/weakness/tingling in arms/legs/face.       PMH: COPD, Neuropathy, HTN, RLS   Surgeries: Cervical  cancer with cervix removal, GB removal      Fhx: significant cancer hx. Mother had lung ca, one sister with myelofibrosis, one sister with lung ca     Personal Hx  -  -1 child      Social Hx  T: quit 4 years ago  A: denies  D: denies     Visit Vitals  OB Status Postmenopausal   Smoking Status Former     PHYSICAL EXAM   Deferred    REVIEW OF SYSTEMS   ROS IN HPI     No Known Allergies    Current Outpatient Medications   Medication Sig Dispense Refill    albuterol 90 mcg/actuation inhaler USE 1 TO 2 INHALATIONS BY  MOUTH EVERY 4 TO 6 HOURS AS NEEDED 25.5 g 6    alendronate (Fosamax) 70 mg tablet Take 1 tablet (70 mg) by mouth every 7 days. Take in the morning with a full glass of water, on an empty stomach, and do not take anything else by mouth or lie down for the next 30 min. 12 tablet 0    atorvastatin (Lipitor) 40 mg tablet Take 1 tablet (40 mg) by mouth once daily. 100 tablet 3    azithromycin (Zithromax) 500 mg tablet Take 0.5 tablets (250 mg) by mouth 3 times a week.      Comp-Air Nebulizer Compressor device use as directed      furosemide (Lasix) 20 mg tablet Take 1 tablet (20 mg) by mouth once daily as needed (edema). 45 tablet 3    gabapentin (Neurontin) 300 mg capsule TAKE 1 CAPSULE BY MOUTH 3 TIMES  DAILY 240 capsule 3    losartan-hydrochlorothiazide (Hyzaar) 100-25 mg tablet TAKE 1 TABLET BY MOUTH ONCE  DAILY 100 tablet 2    metoprolol succinate XL (Toprol-XL) 50 mg 24 hr tablet TAKE 1 TABLET BY MOUTH ONCE  DAILY 100 tablet 2    oxygen (O2) gas therapy Inhale 1 L/min if needed (at home).      pramipexole (Mirapex) 0.5 mg tablet Take 1 tablet (0.5 mg) by mouth 2 times a day. 180 tablet 3    Trelegy Ellipta 200-62.5-25 mcg blister with device INHALE 1 INHALATION BY MOUTH  ONCE DAILY 180 each 3    ipratropium-albuteroL (Duo-Neb) 0.5-2.5 mg/3 mL nebulizer solution Take 3 mL by nebulization 4 times a day as needed for wheezing or shortness of breath. 360 mL 1     No current facility-administered  medications for this visit.       Objective     Office Visit on 01/30/2025   Component Date Value Ref Range Status    VITAMIN D,25-OH,TOTAL,IA 01/30/2025 41  30 - 100 ng/mL Final    CALCIUM 01/30/2025 9.7  8.6 - 10.4 mg/dL Final    HEMOGLOBIN A1c 01/30/2025 6.0 (H)  <5.7 % of total Hgb Final    eAG (mg/dL) 01/30/2025 126  mg/dL Final    eAG (mmol/L) 01/30/2025 7.0  mmol/L Final   Pre-Admission Testing on 12/04/2024   Component Date Value Ref Range Status    Staph/MRSA Screen Culture 12/04/2024 No Staphylococcus aureus isolated   Final   Lab on 10/08/2024   Component Date Value Ref Range Status    WBC 10/08/2024 7.2  4.4 - 11.3 x10*3/uL Final    nRBC 10/08/2024 0.0  0.0 - 0.0 /100 WBCs Final    RBC 10/08/2024 4.62  4.00 - 5.20 x10*6/uL Final    Hemoglobin 10/08/2024 15.1  12.0 - 16.0 g/dL Final    Hematocrit 10/08/2024 46.3 (H)  36.0 - 46.0 % Final    MCV 10/08/2024 100  80 - 100 fL Final    MCH 10/08/2024 32.7  26.0 - 34.0 pg Final    MCHC 10/08/2024 32.6  32.0 - 36.0 g/dL Final    RDW 10/08/2024 13.2  11.5 - 14.5 % Final    Platelets 10/08/2024 404  150 - 450 x10*3/uL Final    Neutrophils % 10/08/2024 69.2  40.0 - 80.0 % Final    Immature Granulocytes %, Automated 10/08/2024 0.3  0.0 - 0.9 % Final    Lymphocytes % 10/08/2024 22.3  13.0 - 44.0 % Final    Monocytes % 10/08/2024 5.4  2.0 - 10.0 % Final    Eosinophils % 10/08/2024 1.5  0.0 - 6.0 % Final    Basophils % 10/08/2024 1.3  0.0 - 2.0 % Final    Neutrophils Absolute 10/08/2024 4.98  1.20 - 7.70 x10*3/uL Final    Immature Granulocytes Absolute, Au* 10/08/2024 0.02  0.00 - 0.70 x10*3/uL Final    Lymphocytes Absolute 10/08/2024 1.60  1.20 - 4.80 x10*3/uL Final    Monocytes Absolute 10/08/2024 0.39  0.10 - 1.00 x10*3/uL Final    Eosinophils Absolute 10/08/2024 0.11  0.00 - 0.70 x10*3/uL Final    Basophils Absolute 10/08/2024 0.09  0.00 - 0.10 x10*3/uL Final    Glucose 10/08/2024 109 (H)  74 - 99 mg/dL Final    Sodium 10/08/2024 138  136 - 145 mmol/L Final     Potassium 10/08/2024 3.9  3.5 - 5.3 mmol/L Final    Chloride 10/08/2024 102  98 - 107 mmol/L Final    Bicarbonate 10/08/2024 28  21 - 32 mmol/L Final    Anion Gap 10/08/2024 12  10 - 20 mmol/L Final    Urea Nitrogen 10/08/2024 8  6 - 23 mg/dL Final    Creatinine 10/08/2024 0.56  0.50 - 1.05 mg/dL Final    eGFR 10/08/2024 >90  >60 mL/min/1.73m*2 Final    Calcium 10/08/2024 9.7  8.6 - 10.6 mg/dL Final    Albumin 10/08/2024 4.2  3.4 - 5.0 g/dL Final    Alkaline Phosphatase 10/08/2024 84  33 - 136 U/L Final    Total Protein 10/08/2024 6.3 (L)  6.4 - 8.2 g/dL Final    AST 10/08/2024 13  9 - 39 U/L Final    Bilirubin, Total 10/08/2024 0.7  0.0 - 1.2 mg/dL Final    ALT 10/08/2024 11  7 - 45 U/L Final    Cholesterol 10/08/2024 205 (H)  0 - 199 mg/dL Final    HDL-Cholesterol 10/08/2024 66.0  mg/dL Final    Cholesterol/HDL Ratio 10/08/2024 3.1   Final    LDL Calculated 10/08/2024 125 (H)  <=99 mg/dL Final    VLDL 10/08/2024 14  0 - 40 mg/dL Final    Triglycerides 10/08/2024 70  0 - 149 mg/dL Final    Non HDL Cholesterol 10/08/2024 139  0 - 149 mg/dL Final    Thyroid Stimulating Hormone 10/08/2024 1.24  0.44 - 3.98 mIU/L Final    Vitamin D, 25-Hydroxy, Total 10/08/2024 45  30 - 100 ng/mL Final       Radiology: Reviewed imaging in powerchart.  XR chest 2 views    Result Date: 1/2/2025  PROCEDURE:  XR CHEST 2 VIEWS HISTORY:  COUGH COMPARISON: Chest x-ray, 8/13/2024 FINDINGS: The lungs are hyperaerated with upper lobe vascular attenuation and biapical scarring, unchanged.  There is no focal infiltrate, pleural effusion or pneumothorax. The cardiomediastinal contours are stable with atherosclerotic aortic calcification. There are no acute bony or soft tissue abnormalities. IMPRESSION: No acute cardiopulmonary process. Overall stable chest with emphysematous changes in the lungs. Electronically signed by:  Robi Burns MD  01/02/2025 02:19 PM EST RP Workstation: [id number] Technologist:  NFF Dictated By:   ROBI BURNS MD  Signed By:     SIENNA WEI MD Signed Out:    25 14:19:59      Family History   Problem Relation Name Age of Onset    COPD Mother Sylvie     Lung cancer Mother Sylvie     Breast cancer Mother Sylvie     Heart attack Father Gopal     Heart disease Father Gopal     Cancer Sister      Cancer Sister Kelley     Cancer Sister Anju     COPD Brother Star     Diabetes Mother's Sister Kita     Breast cancer Mother's Sister Kita     Breast cancer Mother's Sister      Diabetes Mother's Brother Sebastian     Diabetes Mother's Brother Gregory      Social History     Socioeconomic History    Marital status:    Tobacco Use    Smoking status: Former     Current packs/day: 0.00     Types: Cigarettes     Quit date: 2020     Years since quittin.7    Smokeless tobacco: Never    Tobacco comments:     I smoked for 50 years and quit 3 years ago.   Substance and Sexual Activity    Alcohol use: Yes     Comment: I drink beer maybe once a month.    Drug use: Never    Sexual activity: Not Currently     Partners: Male     Birth control/protection: None     Social Drivers of Health     Financial Resource Strain: Low Risk  (10/24/2022)    Received from [facility]    Overall Financial Resource Strain (CARDIA)     Difficulty of Paying Living Expenses: Not hard at all   Food Insecurity: No Food Insecurity (10/24/2022)    Received from [facility]    Hunger Vital Sign     Worried About Running Out of Food in the Last Year: Never true     Ran Out of Food in the Last Year: Never true   Transportation Needs: No Transportation Needs (10/24/2022)    Received from [facility]    PRAPARE - Transportation     Lack of Transportation (Medical): No     Lack of Transportation (Non-Medical): No   Physical Activity: Insufficiently Active (10/24/2022)    Received from [facility]    Exercise Vital Sign     Days of Exercise per Week: 2 days     Minutes of Exercise per Session: 40 min    Stress: No Stress Concern Present (10/24/2022)    Received from [facility]    [employer] [place] [employer] - Occupational Stress Questionnaire     Feeling of Stress : Not at all   Social Connections: Moderately Isolated (10/24/2022)    Received from [facility]    Social Connection and Isolation Panel [NHANES]     Frequency of Communication with Friends and Family: More than three times a week     Frequency of Social Gatherings with Friends and Family: Three times a week     Attends Oriental orthodox Services: 1 to 4 times per year     Active Member of Clubs or Organizations: No     Attends Club or Organization Meetings: Never     Marital Status:    Intimate Partner Violence: Not At Risk (10/24/2022)    Received from [facility]    Humiliation, Afraid, Rape, and Kick questionnaire     Fear of Current or Ex-Partner: No     Emotionally Abused: No     Physically Abused: No     Sexually Abused: No   Housing Stability: Low Risk  (10/24/2022)    Received from [facility]    Housing Stability Vital Sign     Unable to Pay for Housing in the Last Year: No     Number of Places Lived in the Last Year: 2     Unstable Housing in the Last Year: No     Past Medical History:   Diagnosis Date    COPD (chronic obstructive pulmonary disease) (Multi)     Malignant neoplasm of cervix uteri, unspecified     Primary cervical cancer    Oxygen dependent     PRN 2 Liters nasal cannula - home use    Personal history of other diseases of the respiratory system 02/21/2019    History of bronchitis     Past Surgical History:   Procedure Laterality Date    CHOLECYSTECTOMY  01/22/2018    Cholecystectomy    OTHER SURGICAL HISTORY  08/05/2016    Cervical Surgery (Gyn)       Charting was completed using voice recognition technology and may include unintended errors.

## 2025-02-06 DIAGNOSIS — F17.211 CIGARETTE NICOTINE DEPENDENCE IN REMISSION: ICD-10-CM

## 2025-02-11 ENCOUNTER — TELEPHONE (OUTPATIENT)
Dept: PRIMARY CARE | Facility: CLINIC | Age: 70
End: 2025-02-11
Payer: MEDICARE

## 2025-02-11 NOTE — TELEPHONE ENCOUNTER
----- Message from Anabela Beckham sent at 2/10/2025  9:53 PM EST -----  Lung Rads category 2. Low risk. Repeat in 1 year

## 2025-02-19 ENCOUNTER — APPOINTMENT (OUTPATIENT)
Dept: PRIMARY CARE | Facility: CLINIC | Age: 70
End: 2025-02-19
Payer: MEDICARE

## 2025-03-27 ENCOUNTER — APPOINTMENT (OUTPATIENT)
Dept: VASCULAR SURGERY | Facility: CLINIC | Age: 70
End: 2025-03-27
Payer: MEDICARE

## 2025-03-27 VITALS
DIASTOLIC BLOOD PRESSURE: 78 MMHG | HEART RATE: 71 BPM | BODY MASS INDEX: 20.02 KG/M2 | SYSTOLIC BLOOD PRESSURE: 110 MMHG | OXYGEN SATURATION: 95 % | HEIGHT: 71 IN | WEIGHT: 143 LBS

## 2025-03-27 DIAGNOSIS — M79.604 PAIN AND SWELLING OF RIGHT LOWER EXTREMITY: ICD-10-CM

## 2025-03-27 DIAGNOSIS — I87.2 VENOUS STASIS DERMATITIS OF BOTH LOWER EXTREMITIES: Primary | ICD-10-CM

## 2025-03-27 DIAGNOSIS — M79.89 LEG SWELLING: ICD-10-CM

## 2025-03-27 DIAGNOSIS — M79.89 PAIN AND SWELLING OF RIGHT LOWER EXTREMITY: ICD-10-CM

## 2025-03-27 PROCEDURE — 3078F DIAST BP <80 MM HG: CPT | Performed by: SURGERY

## 2025-03-27 PROCEDURE — 1159F MED LIST DOCD IN RCRD: CPT | Performed by: SURGERY

## 2025-03-27 PROCEDURE — 99214 OFFICE O/P EST MOD 30 MIN: CPT | Performed by: SURGERY

## 2025-03-27 PROCEDURE — 3074F SYST BP LT 130 MM HG: CPT | Performed by: SURGERY

## 2025-03-27 PROCEDURE — 1123F ACP DISCUSS/DSCN MKR DOCD: CPT | Performed by: SURGERY

## 2025-03-27 PROCEDURE — 3008F BODY MASS INDEX DOCD: CPT | Performed by: SURGERY

## 2025-03-27 PROCEDURE — 1160F RVW MEDS BY RX/DR IN RCRD: CPT | Performed by: SURGERY

## 2025-03-30 NOTE — PROGRESS NOTES
"Kassy Lockett is a 69 y.o. female     Subjective   This patient presents today for follow-up of her venous issues of her lower extremities.  She currently states that about 2 weeks ago she started developing some slight redness of her right lower leg.  She currently denies any fever chills nausea vomiting or headache.  She states that she has never been able to wear her compression stockings because it increases pain and swelling of her leg.  She quit smoking in 2020.  She is 5 foot 11 and weighs 140 pounds.  She is currently 69 years old.         Objective     Vitals:    03/27/25 0900   BP: 110/78   Pulse: 71   SpO2: 95%      Physical Exam  This patient is alert and oriented x 3.  She is in no acute distress.  Head is normocephalic.  Pupils are equal and reactive to light and accommodation.  Neck is soft and supple without palpable lymph nodes.  Heart is regular rate.  Lungs have some decreased breath sounds posteriorly.  Abdomen is soft with positive bowel sounds there is no pain on palpation.  Upper and lower extremities seem to have adequate range of motion.  She does have palpable brachial radial femoral and popliteal pulses.  Skin turgor is slightly decreased in her lower legs.  Psychologically she seems to be acting appropriately.  She has slight redness involving her right lower leg.  Skin temperature is not considered increased in this area.  Blood pressure 110/78, pulse 71, height 1.803 m (5' 11\"), weight 64.9 kg (143 lb), SpO2 95%.            Patient Active Problem List    Diagnosis Date Noted    Chronic venous insufficiency of lower extremity 11/07/2024    Pain and swelling of lower extremity 11/07/2024    Venous stasis dermatitis of both lower extremities 08/28/2024    Leg swelling 08/28/2024    HTN (hypertension) 07/26/2023    Benign essential HTN 06/02/2023    COPD (chronic obstructive pulmonary disease) (Multi) 06/02/2023    Exertional shortness of breath 06/02/2023    Restless leg syndrome 06/02/2023 "    Bilateral pseudophakia 04/30/2019          Current Outpatient Medications:     albuterol 90 mcg/actuation inhaler, USE 1 TO 2 INHALATIONS BY  MOUTH EVERY 4 TO 6 HOURS AS NEEDED, Disp: 25.5 g, Rfl: 6    alendronate (Fosamax) 70 mg tablet, Take 1 tablet (70 mg) by mouth every 7 days. Take in the morning with a full glass of water, on an empty stomach, and do not take anything else by mouth or lie down for the next 30 min., Disp: 12 tablet, Rfl: 0    atorvastatin (Lipitor) 40 mg tablet, Take 1 tablet (40 mg) by mouth once daily., Disp: 100 tablet, Rfl: 3    azithromycin (Zithromax) 500 mg tablet, Take 0.5 tablets (250 mg) by mouth 3 times a week., Disp: , Rfl:     Comp-Air Nebulizer Compressor device, use as directed, Disp: , Rfl:     furosemide (Lasix) 20 mg tablet, Take 1 tablet (20 mg) by mouth once daily as needed (edema)., Disp: 45 tablet, Rfl: 3    gabapentin (Neurontin) 300 mg capsule, TAKE 1 CAPSULE BY MOUTH 3 TIMES  DAILY, Disp: 240 capsule, Rfl: 3    ipratropium-albuteroL (Duo-Neb) 0.5-2.5 mg/3 mL nebulizer solution, Take 3 mL by nebulization 4 times a day as needed for wheezing or shortness of breath., Disp: 360 mL, Rfl: 1    losartan-hydrochlorothiazide (Hyzaar) 100-25 mg tablet, TAKE 1 TABLET BY MOUTH ONCE  DAILY, Disp: 100 tablet, Rfl: 2    metoprolol succinate XL (Toprol-XL) 50 mg 24 hr tablet, TAKE 1 TABLET BY MOUTH ONCE  DAILY, Disp: 100 tablet, Rfl: 2    oxygen (O2) gas therapy, Inhale 1 L/min if needed (at home)., Disp: , Rfl:     pramipexole (Mirapex) 0.5 mg tablet, Take 1 tablet (0.5 mg) by mouth 2 times a day., Disp: 180 tablet, Rfl: 3    Trelegy Ellipta 200-62.5-25 mcg blister with device, INHALE 1 INHALATION BY MOUTH  ONCE DAILY, Disp: 180 each, Rfl: 3     Lab Results   Component Value Date    WBC 7.2 10/08/2024    HGB 15.1 10/08/2024    HCT 46.3 (H) 10/08/2024     10/08/2024    CHOL 205 (H) 10/08/2024    TRIG 70 10/08/2024    HDL 66.0 10/08/2024    ALT 11 10/08/2024    AST 13  10/08/2024     10/08/2024    K 3.9 10/08/2024     10/08/2024    CREATININE 0.56 10/08/2024    BUN 8 10/08/2024    CO2 28 10/08/2024    TSH 1.24 10/08/2024    HGBA1C 6.0 (H) 01/30/2025       CT lung screening low dose    Result Date: 1/30/2025  These images are not reportable by radiology and will not be interpreted by  Radiologists.                Assessment/Plan   Assessment & Plan      This patient presents today for follow-up of her venous issues of her lower extremities right greater than left.  She states that she started developing some redness involving her right lower leg.  She denies any fever or chills.  Skin temperature is not increased in this area.  She has mild swelling in both lower legs.  She has been on longtime smoker but did quit smoking in May 2020.  If her redness increases then she starts developing pain and swelling, she will need an antibiotic.  For now, I am encouraging her to exercise her lower extremities and to periodically elevate her legs above her heart.  Unfortunately, she is unable to wear compression stockings due to increased pain and swelling which she wears them.  She may have a component of systemic resistance of her venous system due to her cardiopulmonary disease.  I would like her to follow-up in 6 weeks for reevaluation.      Jensen Mercado, DO

## 2025-04-08 DIAGNOSIS — R60.0 PERIPHERAL EDEMA: ICD-10-CM

## 2025-04-08 RX ORDER — FUROSEMIDE 20 MG/1
20 TABLET ORAL DAILY PRN
Qty: 45 TABLET | Refills: 3 | Status: SHIPPED | OUTPATIENT
Start: 2025-04-08 | End: 2026-04-08

## 2025-04-30 ENCOUNTER — APPOINTMENT (OUTPATIENT)
Dept: PRIMARY CARE | Facility: CLINIC | Age: 70
End: 2025-04-30
Payer: MEDICARE

## 2025-05-01 ENCOUNTER — OFFICE VISIT (OUTPATIENT)
Dept: PRIMARY CARE | Facility: CLINIC | Age: 70
End: 2025-05-01
Payer: MEDICARE

## 2025-05-01 VITALS
WEIGHT: 142 LBS | SYSTOLIC BLOOD PRESSURE: 110 MMHG | OXYGEN SATURATION: 95 % | HEIGHT: 71 IN | HEART RATE: 53 BPM | DIASTOLIC BLOOD PRESSURE: 72 MMHG | BODY MASS INDEX: 19.88 KG/M2

## 2025-05-01 DIAGNOSIS — R73.03 PREDIABETES: Primary | ICD-10-CM

## 2025-05-01 DIAGNOSIS — R19.7 DIARRHEA, UNSPECIFIED TYPE: ICD-10-CM

## 2025-05-01 DIAGNOSIS — R63.4 WEIGHT LOSS, NON-INTENTIONAL: ICD-10-CM

## 2025-05-01 LAB — POC HEMOGLOBIN A1C: 5.4 % (ref 4.2–6.5)

## 2025-05-01 PROCEDURE — 1036F TOBACCO NON-USER: CPT | Performed by: STUDENT IN AN ORGANIZED HEALTH CARE EDUCATION/TRAINING PROGRAM

## 2025-05-01 PROCEDURE — 99214 OFFICE O/P EST MOD 30 MIN: CPT | Performed by: STUDENT IN AN ORGANIZED HEALTH CARE EDUCATION/TRAINING PROGRAM

## 2025-05-01 PROCEDURE — 1160F RVW MEDS BY RX/DR IN RCRD: CPT | Performed by: STUDENT IN AN ORGANIZED HEALTH CARE EDUCATION/TRAINING PROGRAM

## 2025-05-01 PROCEDURE — 3008F BODY MASS INDEX DOCD: CPT | Performed by: STUDENT IN AN ORGANIZED HEALTH CARE EDUCATION/TRAINING PROGRAM

## 2025-05-01 PROCEDURE — G2211 COMPLEX E/M VISIT ADD ON: HCPCS | Performed by: STUDENT IN AN ORGANIZED HEALTH CARE EDUCATION/TRAINING PROGRAM

## 2025-05-01 PROCEDURE — 83036 HEMOGLOBIN GLYCOSYLATED A1C: CPT | Performed by: STUDENT IN AN ORGANIZED HEALTH CARE EDUCATION/TRAINING PROGRAM

## 2025-05-01 PROCEDURE — 1123F ACP DISCUSS/DSCN MKR DOCD: CPT | Performed by: STUDENT IN AN ORGANIZED HEALTH CARE EDUCATION/TRAINING PROGRAM

## 2025-05-01 PROCEDURE — 1159F MED LIST DOCD IN RCRD: CPT | Performed by: STUDENT IN AN ORGANIZED HEALTH CARE EDUCATION/TRAINING PROGRAM

## 2025-05-01 PROCEDURE — 3074F SYST BP LT 130 MM HG: CPT | Performed by: STUDENT IN AN ORGANIZED HEALTH CARE EDUCATION/TRAINING PROGRAM

## 2025-05-01 PROCEDURE — 3078F DIAST BP <80 MM HG: CPT | Performed by: STUDENT IN AN ORGANIZED HEALTH CARE EDUCATION/TRAINING PROGRAM

## 2025-05-01 NOTE — PROGRESS NOTES
Subjective   Patient ID: Kassy Lockett is a 69 y.o. female who presents for the following    Assessment/Plan   Preventative Medicine (Done in Oct 2024)  Medicare Wellness October 2024  -UTD on vaccination. Flu shot done in 2024  -Colon ca screening: Done in Jan 2025. 2 cecal AVM cauterized. 7 polyps removed. Repeat in 5 years.   -Lung Ca screening in Feb 2025. Low risk. Repeat in 1 year   -MMG and Breast US in Sept 2024: probably benign L hypoechoic nodule and L axillary tail node. BIRADS 3. Repeat in 1 year  -DEXA scan completed in Oct 2024  -ACP discussed. Has a living will/advanced directive but has her  listed as HCPOA.  has passed away and patient needs to change her HCPOA. Currently will be using her sister in law as HCPOA. Code Status discussed, but patient is currently not sure about DNR vs Full Code. Will remain full code and will put her stipulations in Advanced Directive.     Hyperglycemia  Pre-Diabetes   -Seen on last labs  -A1c in May 2024 was 5.4  -A1c in Jan 2025 was 6.0   -A1c May 2025: 5.4   -Continue low carb diet      Osteoporosis   -Seen on DEXA scan from October 2024  -Calcium: 9.7, Vitamin D 45   PLAN  -Continue fosamax 70mg PO weekly   -Side effect profile discussed. Risk of Jaw osteonecrosis, Atypical fx, esophagitis discussed.     Ganglion cyst on L wrist  -Recommend wrist brace throughout the day  -Supportive care with anti-inflammatories such as motrin/aleve  -If worsens will need to send referral to specialist for drainage. Referral given to Dr Mayo.      HTN  -BP is well controlled on current meds  -Losartan/hydrochlorothiazide, metoprolol  -Low salt/low carb diet recommended     COPD  -Continue Trelegy  -Continue PRN Albuterol   -Follows with pulm (Dr Levi)   -Handicap placard given for COPD as she gets very SOB walking far distances.      RLS  -Continue pramipexole      Vitamin D Insufficiency - resolved  -Vitamin D level is 45  -Continue multivitamins with Vitamin  D     HLD   ASCVD Risk  -ASCVD 8.1%  PLAN  -Continue Lipitor 40mg PO daily rx   -Side effects discussed and patient understands.   -Low fat, low carb diet advised  -Increase exercise as tolerated in the setting of COPD      Insomnia  -Decrease caffeine intake. Currently drinking 2 pots of coffee daily and 2 Pepsi's daily.   -Sleep hygiene  Plan:  -Previously tried trazodone however did not help  -Continue melatonin as needed     CVS with Dermatitis  -Continue follow up with Dr Mercado  -Elevate legs at night  -Try compression stockings or compression wrapping.     Cervical Cancer hx  -s/p cervix removal  -Advised to have well woman exam with OBGYN    Unintentional Weight Loss  Diarrhea-watery   -Age related cancer screening uptodate. Will see GYN for routine female  exam.   -Declining CTAP at this time. Would like labs first and then will consider imaging.   -Check C.Diff   -check stool studies  -Check celiac panel  -Check occult blood         HPI      69-year-old female presents for follow-up    Last visit 3 to 4 months ago showed increase in A1c into prediabetic range.  At that time patient did not want to start medication would like to work on diet and exercise.  A1c is now 5.4 and out of prediabetic range.    Patient today states that she has been having weight loss over the 4 to 6 months.  Has lost approximately 37 pounds since May 2024.  Initially patient states that she was working on decreasing diet and exercise to lose weight and get into a healthier state.  She states that once she stopped dieting exercise weight loss continued.  Since January 2025 patient has lost 67 pounds without diet.  She states that she needs to increase caloric intake as to maintain her weight.  Denies any night sweats, fatigue, chills, abdominal pain, black or bloody stools.    She is up-to-date on mammogram, lung cancer, colon cancer screening.  She does have a history of cervical cancer but states that she had her cervix removed  "after diagnosis of cancer.    She does have a history of chronic intermittent diarrhea which she describes as watery.  Denies any associated cramping, pain, black or bloody stools.  This has been a longstanding issue for her and close back many years per patient. We were unaware of this issue until now.     Denies fevers, chills, ightheadedness, dizziness, vision changes, sore throat, runny nose, CP, SOB, cough, palpitations, n/v, abd pain, black/bloody stools, arthralgias, mood disturbance, or new numbness/weakness/tingling in arms/legs/face.       PMH: COPD, Neuropathy, HTN, RLS   Surgeries: Cervical cancer with cervix removal, GB removal      Fhx: significant cancer hx. Mother had lung ca, one sister with myelofibrosis, one sister with lung ca     Personal Hx  -  -1 child      Social Hx  T: quit 4 years ago  A: denies  D: denies     Visit Vitals  /72   Pulse 53   Ht 1.803 m (5' 11\")   Wt 64.4 kg (142 lb)   SpO2 95%   BMI 19.80 kg/m²   OB Status Postmenopausal   Smoking Status Former   BSA 1.8 m²     PHYSICAL EXAM   Physical Exam     Visit Vitals  /72   Pulse 53   Ht 1.803 m (5' 11\")   Wt 64.4 kg (142 lb)   SpO2 95%   BMI 19.80 kg/m²   OB Status Postmenopausal   Smoking Status Former   BSA 1.8 m²        General: NAD. NCAT. Aox3   HEENT: PERRLA. EOMI. MMM. Nares patent bl. No LAD  Cardiovascular: RRR. No MRG. S1/S2 wnl.   Respiratory: CTABL. No acute respiratory distress.   GI: Soft, NT abdomen. BS present x 4.   MSK: ROM x 4. CTLS non-tender.   Extremities: No edema. Cap refill < 2 sec.   Skin: No rashes or bruises.   Neuro: Aox3. Cranial Nerves grossly intact. Motor/sensory wnl.   Psych: Mood wnl.       REVIEW OF SYSTEMS   ROS IN HPI     No Known Allergies    Current Outpatient Medications   Medication Sig Dispense Refill    albuterol 90 mcg/actuation inhaler USE 1 TO 2 INHALATIONS BY  MOUTH EVERY 4 TO 6 HOURS AS NEEDED 25.5 g 6    atorvastatin (Lipitor) 40 mg tablet Take 1 tablet (40 mg) by " mouth once daily. 100 tablet 3    azithromycin (Zithromax) 500 mg tablet Take 0.5 tablets (250 mg) by mouth 3 times a week.      Comp-Air Nebulizer Compressor device use as directed      furosemide (Lasix) 20 mg tablet Take 1 tablet (20 mg) by mouth once daily as needed (edema). 45 tablet 3    gabapentin (Neurontin) 300 mg capsule TAKE 1 CAPSULE BY MOUTH 3 TIMES  DAILY 240 capsule 3    losartan-hydrochlorothiazide (Hyzaar) 100-25 mg tablet TAKE 1 TABLET BY MOUTH ONCE  DAILY 100 tablet 2    metoprolol succinate XL (Toprol-XL) 50 mg 24 hr tablet TAKE 1 TABLET BY MOUTH ONCE  DAILY 100 tablet 2    oxygen (O2) gas therapy Inhale 1 L/min if needed (at home).      pramipexole (Mirapex) 0.5 mg tablet Take 1 tablet (0.5 mg) by mouth 2 times a day. 180 tablet 3    Trelegy Ellipta 200-62.5-25 mcg blister with device INHALE 1 INHALATION BY MOUTH  ONCE DAILY 180 each 3    alendronate (Fosamax) 70 mg tablet Take 1 tablet (70 mg) by mouth every 7 days. Take in the morning with a full glass of water, on an empty stomach, and do not take anything else by mouth or lie down for the next 30 min. 12 tablet 0    ipratropium-albuteroL (Duo-Neb) 0.5-2.5 mg/3 mL nebulizer solution Take 3 mL by nebulization 4 times a day as needed for wheezing or shortness of breath. 360 mL 1     No current facility-administered medications for this visit.       Objective     Office Visit on 01/30/2025   Component Date Value Ref Range Status    VITAMIN D,25-OH,TOTAL,IA 01/30/2025 41  30 - 100 ng/mL Final    CALCIUM 01/30/2025 9.7  8.6 - 10.4 mg/dL Final    HEMOGLOBIN A1c 01/30/2025 6.0 (H)  <5.7 % of total Hgb Final    eAG (mg/dL) 01/30/2025 126  mg/dL Final    eAG (mmol/L) 01/30/2025 7.0  mmol/L Final       Radiology: Reviewed imaging in powerchart.  XR chest 2 views    Result Date: 1/2/2025  PROCEDURE:  XR CHEST 2 VIEWS HISTORY:  COUGH COMPARISON: Chest x-ray, 8/13/2024 FINDINGS: The lungs are hyperaerated with upper lobe vascular attenuation and biapical  scarring, unchanged.  There is no focal infiltrate, pleural effusion or pneumothorax. The cardiomediastinal contours are stable with atherosclerotic aortic calcification. There are no acute bony or soft tissue abnormalities. IMPRESSION: No acute cardiopulmonary process. Overall stable chest with emphysematous changes in the lungs. Electronically signed by:  Sienna Burns MD  2025 02:19 PM EST RP Workstation: [id number] Technologist:  [email or web address] Dictated By:   SIENNA BURNS MD Signed By:     SIENNA BURNS MD Signed Out:    25 14:19:59      Family History   Problem Relation Name Age of Onset    COPD Mother Sylvie     Lung cancer Mother Sylvie     Breast cancer Mother Sylvie     Heart attack Father Gopal     Heart disease Father Gopal     Cancer Sister      Cancer Sister Kelley     Cancer Sister Anju     COPD Brother Star     Diabetes Mother's Sister Kita     Breast cancer Mother's Sister Kita     Breast cancer Mother's Sister      Diabetes Mother's Brother Sebastian     Diabetes Mother's Brother Gregory      Social History     Socioeconomic History    Marital status:    Tobacco Use    Smoking status: Former     Current packs/day: 0.00     Types: Cigarettes     Quit date: 2020     Years since quittin.9    Smokeless tobacco: Never    Tobacco comments:     I smoked for 50 years and quit 3 years ago.   Substance and Sexual Activity    Alcohol use: Yes     Comment: I drink beer maybe once a month.    Drug use: Never    Sexual activity: Not Currently     Partners: Male     Birth control/protection: None     Social Drivers of Health     Financial Resource Strain: Low Risk  (10/24/2022)    Received from [facility]    Overall Financial Resource Strain (CARDIA)     Difficulty of Paying Living Expenses: Not hard at all   Food Insecurity: No Food Insecurity (10/24/2022)    Received from [facility]    Hunger Vital Sign     Worried About Running Out of Food in the Last Year: Never true     Ran Out of  Food in the Last Year: Never true   Transportation Needs: No Transportation Needs (10/24/2022)    Received from [facility]    PRAPARE - Transportation     Lack of Transportation (Medical): No     Lack of Transportation (Non-Medical): No   Physical Activity: Insufficiently Active (10/24/2022)    Received from [facility]    Exercise Vital Sign     Days of Exercise per Week: 2 days     Minutes of Exercise per Session: 40 min   Stress: No Stress Concern Present (10/24/2022)    Received from [facility]    [employer] [place] [employer] - Occupational Stress Questionnaire     Feeling of Stress : Not at all   Social Connections: Moderately Isolated (10/24/2022)    Received from [facility]    Social Connection and Isolation Panel [NHANES]     Frequency of Communication with Friends and Family: More than three times a week     Frequency of Social Gatherings with Friends and Family: Three times a week     Attends [employer] Services: 1 to 4 times per year     Active Member of Clubs or Organizations: No     Attends Club or Organization Meetings: Never     Marital Status:    Intimate Partner Violence: Not At Risk (10/24/2022)    Received from [facility]    Humiliation, Afraid, Rape, and Kick questionnaire     Fear of Current or Ex-Partner: No     Emotionally Abused: No     Physically Abused: No     Sexually Abused: No   Housing Stability: Low Risk  (10/24/2022)    Received from [facility]    Housing Stability Vital Sign     Unable to Pay for Housing in the Last Year: No     Number of Places Lived in the Last Year: 2     Unstable Housing in the Last Year: No     Past Medical History:   Diagnosis Date    COPD (chronic obstructive pulmonary disease) (Multi)     Malignant neoplasm of cervix uteri, unspecified     Primary cervical cancer    Oxygen dependent     PRN 2 Liters nasal cannula - home use    Personal history of other diseases of the respiratory system 02/21/2019    History of bronchitis     Past  Surgical History:   Procedure Laterality Date    CHOLECYSTECTOMY  01/22/2018    Cholecystectomy    OTHER SURGICAL HISTORY  08/05/2016    Cervical Surgery (Gyn)       Charting was completed using voice recognition technology and may include unintended errors.

## 2025-05-02 LAB
ALBUMIN SERPL-MCNC: 4.5 G/DL (ref 3.6–5.1)
ALP SERPL-CCNC: 69 U/L (ref 37–153)
ALT SERPL-CCNC: 15 U/L (ref 6–29)
ANION GAP SERPL CALCULATED.4IONS-SCNC: 8 MMOL/L (CALC) (ref 7–17)
AST SERPL-CCNC: 15 U/L (ref 10–35)
BASOPHILS # BLD AUTO: 87 CELLS/UL (ref 0–200)
BASOPHILS NFR BLD AUTO: 1 %
BILIRUB SERPL-MCNC: 0.7 MG/DL (ref 0.2–1.2)
BUN SERPL-MCNC: 10 MG/DL (ref 7–25)
CALCIUM SERPL-MCNC: 9.5 MG/DL (ref 8.6–10.4)
CHLORIDE SERPL-SCNC: 103 MMOL/L (ref 98–110)
CO2 SERPL-SCNC: 28 MMOL/L (ref 20–32)
CREAT SERPL-MCNC: 0.49 MG/DL (ref 0.5–1.05)
EGFRCR SERPLBLD CKD-EPI 2021: 103 ML/MIN/1.73M2
EOSINOPHIL # BLD AUTO: 70 CELLS/UL (ref 15–500)
EOSINOPHIL NFR BLD AUTO: 0.8 %
ERYTHROCYTE [DISTWIDTH] IN BLOOD BY AUTOMATED COUNT: 12.4 % (ref 11–15)
GLIADIN IGA SER IA-ACNC: <1 U/ML
GLIADIN IGG SER IA-ACNC: <1 U/ML
GLUCOSE SERPL-MCNC: 96 MG/DL (ref 65–139)
HCT VFR BLD AUTO: 45.1 % (ref 35–45)
HGB BLD-MCNC: 15.5 G/DL (ref 11.7–15.5)
IGA SERPL-MCNC: 257 MG/DL (ref 70–320)
LYMPHOCYTES # BLD AUTO: 2018 CELLS/UL (ref 850–3900)
LYMPHOCYTES NFR BLD AUTO: 23.2 %
MCH RBC QN AUTO: 33 PG (ref 27–33)
MCHC RBC AUTO-ENTMCNC: 34.4 G/DL (ref 32–36)
MCV RBC AUTO: 96.2 FL (ref 80–100)
MONOCYTES # BLD AUTO: 505 CELLS/UL (ref 200–950)
MONOCYTES NFR BLD AUTO: 5.8 %
NEUTROPHILS # BLD AUTO: 6020 CELLS/UL (ref 1500–7800)
NEUTROPHILS NFR BLD AUTO: 69.2 %
PLATELET # BLD AUTO: 374 THOUSAND/UL (ref 140–400)
PMV BLD REES-ECKER: 8.9 FL (ref 7.5–12.5)
POTASSIUM SERPL-SCNC: 4.3 MMOL/L (ref 3.5–5.3)
PROT SERPL-MCNC: 6.5 G/DL (ref 6.1–8.1)
RBC # BLD AUTO: 4.69 MILLION/UL (ref 3.8–5.1)
SODIUM SERPL-SCNC: 139 MMOL/L (ref 135–146)
TSH SERPL-ACNC: 3.41 MIU/L (ref 0.4–4.5)
TTG IGA SER-ACNC: <1 U/ML
TTG IGG SER-ACNC: <1 U/ML
WBC # BLD AUTO: 8.7 THOUSAND/UL (ref 3.8–10.8)

## 2025-05-05 ENCOUNTER — PATIENT MESSAGE (OUTPATIENT)
Dept: PRIMARY CARE | Facility: CLINIC | Age: 70
End: 2025-05-05
Payer: MEDICARE

## 2025-05-08 ENCOUNTER — APPOINTMENT (OUTPATIENT)
Dept: VASCULAR SURGERY | Facility: CLINIC | Age: 70
End: 2025-05-08
Payer: MEDICARE

## 2025-05-08 VITALS
SYSTOLIC BLOOD PRESSURE: 122 MMHG | HEIGHT: 71 IN | BODY MASS INDEX: 19.74 KG/M2 | DIASTOLIC BLOOD PRESSURE: 68 MMHG | WEIGHT: 141 LBS

## 2025-05-08 DIAGNOSIS — I87.2 VENOUS STASIS DERMATITIS OF BOTH LOWER EXTREMITIES: ICD-10-CM

## 2025-05-08 DIAGNOSIS — M79.89 LEG SWELLING: ICD-10-CM

## 2025-05-08 DIAGNOSIS — I87.2 CHRONIC VENOUS INSUFFICIENCY OF LOWER EXTREMITY: ICD-10-CM

## 2025-05-08 DIAGNOSIS — I80.00 SUPERFICIAL THROMBOPHLEBITIS OF LOWER EXTREMITY, UNSPECIFIED LATERALITY: Primary | ICD-10-CM

## 2025-05-08 PROBLEM — I80.9 SUPERFICIAL THROMBOPHLEBITIS: Status: ACTIVE | Noted: 2025-05-08

## 2025-05-08 PROCEDURE — 3074F SYST BP LT 130 MM HG: CPT | Performed by: SURGERY

## 2025-05-08 PROCEDURE — 3008F BODY MASS INDEX DOCD: CPT | Performed by: SURGERY

## 2025-05-08 PROCEDURE — 1159F MED LIST DOCD IN RCRD: CPT | Performed by: SURGERY

## 2025-05-08 PROCEDURE — 99214 OFFICE O/P EST MOD 30 MIN: CPT | Performed by: SURGERY

## 2025-05-08 PROCEDURE — 3078F DIAST BP <80 MM HG: CPT | Performed by: SURGERY

## 2025-05-08 PROCEDURE — 1036F TOBACCO NON-USER: CPT | Performed by: SURGERY

## 2025-05-08 PROCEDURE — 1160F RVW MEDS BY RX/DR IN RCRD: CPT | Performed by: SURGERY

## 2025-05-08 NOTE — PROGRESS NOTES
"Kassy Lockett is a 69 y.o. female     Subjective   This patient presents today for follow-up of her venous issues of her lower extremities.  She does have significant cardiopulmonary disease.  I have done intravascular ultrasound to check for May Thurner because of the deep system reflux which was negative.  She states that her right lower leg is getting worse.  She has tenderness around the lower leg.  She denies any fever chills nausea vomiting or headache.  She is currently not on any anticoagulation.         Objective     Vitals:    05/08/25 0700   BP: 122/68      Physical Exam  This patient is alert and oriented x 3.  She is in no acute distress.  Head is normocephalic.  Pupils are equal and reactive to light and accommodation.  Neck is soft and supple without palpable lymph nodes.  Heart is regular rate.  Lungs have some decreased breath sounds posteriorly.  Abdomen is soft with positive bowel sounds there is no pain on palpation.  Upper and lower extremities seem to have adequate range of motion.  She does have palpable brachial radial femoral and popliteal pulses.  She has some redness and some tenderness around her right lower leg above her ankle.  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  Psychologically she appears to be acting appropriately.  Blood pressure 122/68, height 1.803 m (5' 11\"), weight 64 kg (141 lb).            Patient Active Problem List    Diagnosis Date Noted    Chronic venous insufficiency of lower extremity 11/07/2024    Pain and swelling of lower extremity 11/07/2024    Venous stasis dermatitis of both lower extremities 08/28/2024    Leg swelling 08/28/2024    HTN (hypertension) 07/26/2023    Benign essential HTN 06/02/2023    COPD (chronic obstructive pulmonary disease) (Multi) 06/02/2023    Exertional shortness of breath 06/02/2023    Restless leg syndrome 06/02/2023    Bilateral pseudophakia 04/30/2019        Current Medications[1] "     Lab Results   Component Value Date    WBC 8.7 05/01/2025    HGB 15.5 05/01/2025    HCT 45.1 (H) 05/01/2025     05/01/2025    CHOL 205 (H) 10/08/2024    TRIG 70 10/08/2024    HDL 66.0 10/08/2024    ALT 15 05/01/2025    AST 15 05/01/2025     05/01/2025    K 4.3 05/01/2025     05/01/2025    CREATININE 0.49 (L) 05/01/2025    BUN 10 05/01/2025    CO2 28 05/01/2025    TSH 3.41 05/01/2025    HGBA1C 5.4 05/01/2025       CT lung screening low dose  Result Date: 1/30/2025  These images are not reportable by radiology and will not be interpreted by  Radiologists.                Assessment/Plan   Assessment & Plan      This patient presents today for follow-up of her venous issues of her lower extremities.  She states that she feels her right lower leg is getting worse.  She has increased tenderness and some swelling.  She does have significant deep system reflux bilaterally.  I did previously do intravascular ultrasound which was negative for May-Cristopherurner.  She does have severe pulmonary disease.  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  I am asking her to take 200 mg of ibuprofen 3 times a day and also, I am starting her on Eliquis 5 mg twice a day.  I also am asking her to place heating pad over the area.  If her redness and pain get worse, then I would start her on antibiotics.  She will follow-up with me in 2 weeks.  I asked her to call the office next week to see how her symptoms are progressing.      Jensen Mercado, DO        [1]   Current Outpatient Medications:     albuterol 90 mcg/actuation inhaler, USE 1 TO 2 INHALATIONS BY  MOUTH EVERY 4 TO 6 HOURS AS NEEDED, Disp: 25.5 g, Rfl: 6    alendronate (Fosamax) 70 mg tablet, Take 1 tablet (70 mg) by mouth every 7 days. Take in the morning with a full glass of water, on an empty stomach, and do not take anything else by mouth or lie down for the next 30 min., Disp: 12 tablet,  Rfl: 0    atorvastatin (Lipitor) 40 mg tablet, Take 1 tablet (40 mg) by mouth once daily., Disp: 100 tablet, Rfl: 3    azithromycin (Zithromax) 500 mg tablet, Take 0.5 tablets (250 mg) by mouth 3 times a week., Disp: , Rfl:     Comp-Air Nebulizer Compressor device, use as directed, Disp: , Rfl:     furosemide (Lasix) 20 mg tablet, Take 1 tablet (20 mg) by mouth once daily as needed (edema)., Disp: 45 tablet, Rfl: 3    gabapentin (Neurontin) 300 mg capsule, TAKE 1 CAPSULE BY MOUTH 3 TIMES  DAILY, Disp: 240 capsule, Rfl: 3    ipratropium-albuteroL (Duo-Neb) 0.5-2.5 mg/3 mL nebulizer solution, Take 3 mL by nebulization 4 times a day as needed for wheezing or shortness of breath., Disp: 360 mL, Rfl: 1    losartan-hydrochlorothiazide (Hyzaar) 100-25 mg tablet, TAKE 1 TABLET BY MOUTH ONCE  DAILY, Disp: 100 tablet, Rfl: 2    metoprolol succinate XL (Toprol-XL) 50 mg 24 hr tablet, TAKE 1 TABLET BY MOUTH ONCE  DAILY, Disp: 100 tablet, Rfl: 2    oxygen (O2) gas therapy, Inhale 1 L/min if needed (at home)., Disp: , Rfl:     pramipexole (Mirapex) 0.5 mg tablet, Take 1 tablet (0.5 mg) by mouth 2 times a day., Disp: 180 tablet, Rfl: 3    Trelegy Ellipta 200-62.5-25 mcg blister with device, INHALE 1 INHALATION BY MOUTH  ONCE DAILY, Disp: 180 each, Rfl: 3

## 2025-05-14 LAB
NON-UH HIE BASO COUNT: 0.02 X1000 (ref 0–0.2)
NON-UH HIE BASOS %: 0.3 %
NON-UH HIE DIFF?: ABNORMAL
NON-UH HIE EOS COUNT: 0.11 X1000 (ref 0–0.5)
NON-UH HIE EOSIN %: 1.8 %
NON-UH HIE HCT: 41.7 % (ref 36–46)
NON-UH HIE HGB: 14.5 G/DL (ref 12–16)
NON-UH HIE INSTR WBC: 6.3
NON-UH HIE LYMPH %: 29.2 %
NON-UH HIE LYMPH COUNT: 1.84 X1000 (ref 1.2–4.8)
NON-UH HIE MCH: 33.7 PG (ref 27–34)
NON-UH HIE MCHC: 34.8 G/DL (ref 32–37)
NON-UH HIE MCV: 96.7 FL (ref 80–100)
NON-UH HIE MONO %: 5.8 %
NON-UH HIE MONO COUNT: 0.36 X1000 (ref 0.1–1)
NON-UH HIE MPV: 7.2 FL (ref 7.4–10.4)
NON-UH HIE NEUTROPHIL %: 63 %
NON-UH HIE NEUTROPHIL COUNT (ANC): 3.98 X1000 (ref 1.4–8.8)
NON-UH HIE NUCLEATED RBC: 0 /100WBC
NON-UH HIE PLATELET: 381 X10 (ref 150–450)
NON-UH HIE RBC: 4.32 X10 (ref 4.2–5.4)
NON-UH HIE RDW: 13.5 % (ref 11.5–14.5)
NON-UH HIE WBC: 6.3 X10 (ref 4.5–11)

## 2025-05-15 LAB
C COLI+JEJUNI+LARI FUSA STL QL NAA+PROBE: NOT DETECTED
C DIFF TOX GENS STL QL NAA+PROBE: NOT DETECTED
EC STX1 GENE STL QL NAA+PROBE: NOT DETECTED
EC STX2 GENE STL QL NAA+PROBE: NOT DETECTED
NOROV GI+II ORF1-ORF2 JNC STL QL NAA+PR: NOT DETECTED
RVA NSP5 STL QL NAA+PROBE: NOT DETECTED
SALMONELLA SP RPOD STL QL NAA+PROBE: NOT DETECTED
SHIGELLA DNA SPEC QL NAA+PROBE: NOT DETECTED
V CHOL+PARA RFBL+TRKH+TNAA STL QL NAA+PR: NOT DETECTED
Y ENTERO RECN STL QL NAA+PROBE: NOT DETECTED

## 2025-05-16 ENCOUNTER — PATIENT MESSAGE (OUTPATIENT)
Dept: PRIMARY CARE | Facility: CLINIC | Age: 70
End: 2025-05-16
Payer: MEDICARE

## 2025-05-19 LAB — NON-UH HIE MISC SENDOUT: NORMAL

## 2025-05-21 ENCOUNTER — APPOINTMENT (OUTPATIENT)
Dept: VASCULAR SURGERY | Facility: CLINIC | Age: 70
End: 2025-05-21
Payer: MEDICARE

## 2025-05-21 VITALS
BODY MASS INDEX: 19.18 KG/M2 | OXYGEN SATURATION: 99 % | WEIGHT: 137 LBS | SYSTOLIC BLOOD PRESSURE: 118 MMHG | DIASTOLIC BLOOD PRESSURE: 74 MMHG | HEART RATE: 68 BPM | HEIGHT: 71 IN

## 2025-05-21 DIAGNOSIS — I87.2 VENOUS STASIS DERMATITIS OF BOTH LOWER EXTREMITIES: Primary | ICD-10-CM

## 2025-05-21 DIAGNOSIS — I87.2 CHRONIC VENOUS INSUFFICIENCY OF LOWER EXTREMITY: ICD-10-CM

## 2025-05-21 DIAGNOSIS — M79.89 LEG SWELLING: ICD-10-CM

## 2025-05-21 DIAGNOSIS — I80.03 THROMBOPHLEBITIS OF SUPERFICIAL VEINS OF BOTH LOWER EXTREMITIES: ICD-10-CM

## 2025-05-21 PROCEDURE — 3074F SYST BP LT 130 MM HG: CPT | Performed by: SURGERY

## 2025-05-21 PROCEDURE — 3078F DIAST BP <80 MM HG: CPT | Performed by: SURGERY

## 2025-05-21 PROCEDURE — 1160F RVW MEDS BY RX/DR IN RCRD: CPT | Performed by: SURGERY

## 2025-05-21 PROCEDURE — 1159F MED LIST DOCD IN RCRD: CPT | Performed by: SURGERY

## 2025-05-21 PROCEDURE — 3008F BODY MASS INDEX DOCD: CPT | Performed by: SURGERY

## 2025-05-21 PROCEDURE — 99214 OFFICE O/P EST MOD 30 MIN: CPT | Performed by: SURGERY

## 2025-05-23 NOTE — PROGRESS NOTES
"Kassy Lockett is a 69 y.o. female     Subjective   This patient presents today for follow-up of her venous issues of her lower extremities.  She does have significant cardiopulmonary disease.  I have previously done intravascular ultrasound because of her significant deep system reflux which was negative for May-Thurner.  She states that her right lower extremity seems to be getting a little bit worse.  She also states that as warmer weather approaches, her swelling does get worse.  She currently denies any fever chills nausea vomiting or headache.         Objective     Vitals:    05/21/25 0900   BP: 118/74   Pulse: 68   SpO2: 99%      Physical Exam  This patient is alert and oriented x 3.  She is in no acute distress.  Head is normocephalic.  Pupils are equal and reactive to light and accommodation.  Neck is soft and supple without palpable lymph nodes.  Heart is regular rate.  Lungs have decreased breath sounds posteriorly.  Abdomen is soft with positive bowel sounds there is no pain on palpation.  Upper and lower extremities seem to have adequate range of motion.  She has palpable brachial radial femoral and popliteal pulses.  Skin turgor is decreased in both lower legs.  Psychologically she seems to be acting appropriately.  Her right ankle swelling is greater than her left.  Blood pressure 118/74, pulse 68, height 1.803 m (5' 11\"), weight 62.1 kg (137 lb), SpO2 99%.            Patient Active Problem List    Diagnosis Date Noted    Superficial thrombophlebitis 05/08/2025    Chronic venous insufficiency of lower extremity 11/07/2024    Pain and swelling of lower extremity 11/07/2024    Venous stasis dermatitis of both lower extremities 08/28/2024    Leg swelling 08/28/2024    HTN (hypertension) 07/26/2023    Benign essential HTN 06/02/2023    COPD (chronic obstructive pulmonary disease) (Multi) 06/02/2023    Exertional shortness of breath 06/02/2023    Restless leg syndrome 06/02/2023    Bilateral pseudophakia " 04/30/2019        Current Medications[1]     Lab Results   Component Value Date    WBC 8.7 05/01/2025    HGB 15.5 05/01/2025    HCT 45.1 (H) 05/01/2025     05/01/2025    CHOL 205 (H) 10/08/2024    TRIG 70 10/08/2024    HDL 66.0 10/08/2024    ALT 15 05/01/2025    AST 15 05/01/2025     05/01/2025    K 4.3 05/01/2025     05/01/2025    CREATININE 0.49 (L) 05/01/2025    BUN 10 05/01/2025    CO2 28 05/01/2025    TSH 3.41 05/01/2025    HGBA1C 5.4 05/01/2025       CT lung screening low dose  Result Date: 1/30/2025  These images are not reportable by radiology and will not be interpreted by  Radiologists.                Assessment/Plan   Assessment & Plan      This patient presents today for follow-up of her venous issues of her lower extremities.  As the day goes on, she does get fairly significant swelling involving her ankles and feet.  Her right lower leg seems worse than her left.  She did have previous venous duplex scan with reflux study.  Did show significant deep system reflux.  I did do intravascular ultrasound of which was negative for May-Thurner.  I do suspect that her pulmonary disease is creating venous resistance and contributing to her lower extremity swelling.  I am encouraging her to exercise her lower extremities on a daily basis if able.  She does get shortness of breath fairly quickly and does limit her ambulation with certain distances.  She needs to elevate her legs above her heart whenever possible.  At this time, I would like her to follow-up in 6 months.      Jensen Mercado, DO        [1]   Current Outpatient Medications:     albuterol 90 mcg/actuation inhaler, USE 1 TO 2 INHALATIONS BY  MOUTH EVERY 4 TO 6 HOURS AS NEEDED, Disp: 25.5 g, Rfl: 6    alendronate (Fosamax) 70 mg tablet, Take 1 tablet (70 mg) by mouth every 7 days. Take in the morning with a full glass of water, on an empty stomach, and do not take anything else by mouth or lie down for the next 30 min., Disp: 12  tablet, Rfl: 0    atorvastatin (Lipitor) 40 mg tablet, Take 1 tablet (40 mg) by mouth once daily., Disp: 100 tablet, Rfl: 3    azithromycin (Zithromax) 500 mg tablet, Take 0.5 tablets (250 mg) by mouth 3 times a week., Disp: , Rfl:     Comp-Air Nebulizer Compressor device, use as directed, Disp: , Rfl:     furosemide (Lasix) 20 mg tablet, Take 1 tablet (20 mg) by mouth once daily as needed (edema)., Disp: 45 tablet, Rfl: 3    gabapentin (Neurontin) 300 mg capsule, TAKE 1 CAPSULE BY MOUTH 3 TIMES  DAILY, Disp: 240 capsule, Rfl: 3    ipratropium-albuteroL (Duo-Neb) 0.5-2.5 mg/3 mL nebulizer solution, Take 3 mL by nebulization 4 times a day as needed for wheezing or shortness of breath., Disp: 360 mL, Rfl: 1    losartan-hydrochlorothiazide (Hyzaar) 100-25 mg tablet, TAKE 1 TABLET BY MOUTH ONCE  DAILY, Disp: 100 tablet, Rfl: 2    metoprolol succinate XL (Toprol-XL) 50 mg 24 hr tablet, TAKE 1 TABLET BY MOUTH ONCE  DAILY, Disp: 100 tablet, Rfl: 2    oxygen (O2) gas therapy, Inhale 1 L/min if needed (at home)., Disp: , Rfl:     pramipexole (Mirapex) 0.5 mg tablet, Take 1 tablet (0.5 mg) by mouth 2 times a day., Disp: 180 tablet, Rfl: 3    Trelegy Ellipta 200-62.5-25 mcg blister with device, INHALE 1 INHALATION BY MOUTH  ONCE DAILY, Disp: 180 each, Rfl: 3

## 2025-07-13 NOTE — PROGRESS NOTES
"Kassy Lockett is a 69 y.o. female     Subjective   This patient presents today for follow-up of her venous issues of her lower extremities.  She is currently 69 years old.  She is 5 foot 11 and weighs 154 pounds.  She states that she is having significant pain and swelling of both lower legs along with itching and some redness.  She denies any fever chills nausea vomiting or headache.  It is difficult for her to wear stockings.         Objective     Vitals:    11/07/24 0900   BP: 120/68   Pulse: 72   SpO2: 96%      Physical Exam  This patient is alert and oriented x 3.  She is in no acute distress.  Head is normocephalic.  Pupils are equal and reactive to light and accommodation.  Neck is soft and supple without palpable lymph nodes.  Heart is regular rate.  Lungs have some decreased breath sounds posteriorly.  Abdomen is soft with positive bowel sounds there is no pain on palpation.  Upper extremities seem to have adequate range of motion.  Her lower extremities have some decreased range of motion.  She does have palpable brachial radial femoral popliteal and pedal pulses.  Skin turgor is decreased in her lower legs bilaterally.  She does have some tenderness to palpation in her lower legs along with some pitting edema.  Psychologically she appears to be acting appropriately.  Blood pressure 120/68, pulse 72, height 1.803 m (5' 11\"), weight 69.9 kg (154 lb), SpO2 96%.            Patient Active Problem List    Diagnosis Date Noted    Venous stasis dermatitis of both lower extremities 08/28/2024    Leg swelling 08/28/2024    HTN (hypertension) 07/26/2023    Benign essential HTN 06/02/2023    COPD (chronic obstructive pulmonary disease) (Multi) 06/02/2023    Exertional shortness of breath 06/02/2023    Restless leg syndrome 06/02/2023    Bilateral pseudophakia 04/30/2019          Current Outpatient Medications:     albuterol 90 mcg/actuation inhaler, USE 1 TO 2 INHALATIONS BY  MOUTH EVERY 4 TO 6 HOURS AS NEEDED, Disp: " 25.5 g, Rfl: 6    alendronate (Fosamax) 70 mg tablet, Take 1 tablet (70 mg) by mouth every 7 days. Take in the morning with a full glass of water, on an empty stomach, and do not take anything else by mouth or lie down for the next 30 min., Disp: 4 tablet, Rfl: 11    atorvastatin (Lipitor) 40 mg tablet, Take 1 tablet (40 mg) by mouth once daily., Disp: 100 tablet, Rfl: 3    Comp-Air Nebulizer Compressor device, use as directed, Disp: , Rfl:     furosemide (Lasix) 20 mg tablet, Take 1 tablet (20 mg) by mouth once daily as needed (edema)., Disp: 45 tablet, Rfl: 3    gabapentin (Neurontin) 300 mg capsule, TAKE 1 CAPSULE BY MOUTH 3 TIMES  DAILY, Disp: 240 capsule, Rfl: 3    ipratropium-albuteroL (Duo-Neb) 0.5-2.5 mg/3 mL nebulizer solution, Take 3 mL by nebulization 4 times a day as needed for wheezing or shortness of breath., Disp: 360 mL, Rfl: 1    losartan-hydrochlorothiazide (Hyzaar) 100-25 mg tablet, TAKE 1 TABLET BY MOUTH ONCE  DAILY, Disp: 100 tablet, Rfl: 2    metoprolol succinate XL (Toprol-XL) 50 mg 24 hr tablet, TAKE 1 TABLET BY MOUTH ONCE  DAILY, Disp: 100 tablet, Rfl: 2    pramipexole (Mirapex) 0.5 mg tablet, TAKE 1 OR 2 TABLETS BY MOUTH AT  BEDTIME FOR RESTLESS LEGS, Disp: 180 tablet, Rfl: 3    traZODone (Desyrel) 50 mg tablet, Take 1 tablet (50 mg) by mouth as needed at bedtime for sleep., Disp: 30 tablet, Rfl: 5    Trelegy Ellipta 200-62.5-25 mcg blister with device, INHALE 1 INHALATION BY MOUTH  ONCE DAILY, Disp: 180 each, Rfl: 3     Lab Results   Component Value Date    WBC 7.2 10/08/2024    HGB 15.1 10/08/2024    HCT 46.3 (H) 10/08/2024     10/08/2024    CHOL 205 (H) 10/08/2024    TRIG 70 10/08/2024    HDL 66.0 10/08/2024    ALT 11 10/08/2024    AST 13 10/08/2024     10/08/2024    K 3.9 10/08/2024     10/08/2024    CREATININE 0.56 10/08/2024    BUN 8 10/08/2024    CO2 28 10/08/2024    TSH 1.24 10/08/2024    HGBA1C 5.5 05/22/2024       Vascular US lower extremity venous insufficiency  bilateral    Result Date: 10/11/2024           [facility] [address] [place], [place] [address] Tel [phone] and Fax [phone]  Vascular Lab Report [facility] US LOWER EXTREMITY VENOUS INSUFFICIENCY BILATERAL  Patient Name:      ROSANNE LUND        Azar Physician: [id number] Anastasiya Peterson MD Study Date:        10/11/2024          Ordering           [id number] KAE ROBERTA PARRA                                        Physician: MRN/PID:           [id number]            Technologist:      Vandana Aguayo S Accession#:        [id number]        Technologist 2: Date of Birth/Age: 1955 / 69      Encounter#:        [id number]                    years Gender:            F Admission Status:  Outpatient          Location           [facility]                                        Performed:  Diagnosis/ICD:    Localized (leg) edema-R60.0 Indication:       Limb edema CPT Codes:        61818 Venous reflux study VV VI complete Patient Position: Study performed in a reverse Trendelenburg position.  CONCLUSIONS: Right Lower Venous Insufficiency: There is reflux noted in the iliac, common femoral and mid femoral veins. Unable to augment great saphenous vein from mid thigh through proximal calf due to small size. Left Lower Venous Insufficiency: Reflux is noted in the saphenofemoral junction vein. There is reflux noted in the iliac, common femoral, mid femoral and popliteal veins. Unable to augment great saphenous vein from proximal thigh through proximal calf due to small size. Right Lower Venous: No evidence of acute deep vein thrombus visualized in the right lower extremity. Left Lower Venous: No evidence of acute deep vein thrombus visualized in the left lower extremity. Additional Findings; Lymph nodes left groin measuring 1.2 cm x 0.8 cm.  Imaging & Doppler Findings:  Right          Compress Thrombus SFJ              Yes      None Prox Thigh GSV   Yes       None Mid Thigh GSV    Yes      None Knee GSV         Yes      None Prox Calf GSV    Yes      None Mid Calf GSV     Yes      None Dist Calf GSV    Yes      None SSV Prox         Yes      None SSV Mid          Yes      None SSV Distal       Yes      None  Left           Compress Thrombus  Diam   Depth    Time SFJ              Yes      None   3.6 mm 10.0 mm 4.10 sec Prox Thigh GSV   Yes      None   1.1 mm Mid Thigh GSV    Yes      None   1.2 mm Knee GSV         Yes      None   0.7 mm Prox Calf GSV    Yes      None   0.8 mm Mid Calf GSV     Yes      None Dist Calf GSV    Yes      None SSV Prox          No    Fibrotic SSV Mid           No    Fibrotic SSV Distal        No    Fibrotic  Right                 Compressible Thrombus        Flow Distal External Iliac     Yes        None         Reflux CFV                       Yes        None         Reflux PFV                       Yes        None FV Proximal               Yes        None FV Mid                    Yes        None         Reflux FV Distal                 Yes        None Popliteal                 Yes        None   Spontaneous/Phasic Peroneal                  Yes        None PTV                       Yes        None  Left                  Compress Thrombus  Flow Distal External Iliac   Yes      None   Reflux CFV                     Yes      None   Reflux PFV                     Yes      None FV Proximal             Yes      None FV Mid                  Yes      None   Reflux FV Distal               Yes      None Popliteal               Yes      None   Reflux Peroneal                Yes      None PTV                     Yes      None  [id number] Anastasiya Peterson MD Electronically signed by [id number] Anastasiya Peterson MD on 10/11/2024 at 4:32:23 PM  ** Final **                 Assessment/Plan   Active Problems:  There are no active Hospital Problems.      This patient presents today for follow-up of her venous issues of her lower extremities.  She continues to complain of fairly  significant pain and swelling of her lower legs.  She states that they are also itching.  She also has some slight redness involving both lower legs.  She did recently have a venous duplex scan with reflux study and I have extensively gone over the results with her.  This does show significant reflux in her deep system bilaterally.  This also includes her distal external iliac veins bilaterally.  At this time, I have a high suspicion for possible May Thurner syndrome.  I do feel that she would benefit from intravascular ultrasound bilateral iliac veins and inferior vena cava with possible intervention.  The patient is in full agreement with this plan/option.  Risk and benefits have been explained.  We will proceed accordingly.      Jensen Mercado, DO    Admission Reconciliation is Not Complete  Discharge Reconciliation is Not Complete Admission Reconciliation is Not Complete  Discharge Reconciliation is Completed

## 2025-07-17 ENCOUNTER — TELEPHONE (OUTPATIENT)
Dept: PRIMARY CARE | Facility: CLINIC | Age: 70
End: 2025-07-17
Payer: MEDICARE

## 2025-07-17 NOTE — TELEPHONE ENCOUNTER
Patient would like a recommendation of an ophthalmologist, she is having problems with her left eye. She has difficulty looking out of her left eye, black and white sheets come across her field of vision, and black spots show up, too. Patient wants her ophthalmologist around the [facility] area. She does have an appointment with her ophthalmologist on 7/28, but she wants to know if she can be seen by a different doctor sooner than 7/28. Please advise.

## 2025-08-19 DIAGNOSIS — G25.81 RESTLESS LEG SYNDROME: ICD-10-CM

## 2025-08-20 RX ORDER — PRAMIPEXOLE DIHYDROCHLORIDE 0.5 MG/1
0.5 TABLET ORAL 2 TIMES DAILY
Qty: 180 TABLET | Refills: 0 | Status: SHIPPED | OUTPATIENT
Start: 2025-08-20

## 2025-11-20 ENCOUNTER — APPOINTMENT (OUTPATIENT)
Dept: VASCULAR SURGERY | Facility: CLINIC | Age: 70
End: 2025-11-20
Payer: MEDICARE

## (undated) DEVICE — APPLICATOR, CHLORAPREP, W/ORANGE TINT, 26ML

## (undated) DEVICE — BAG, DECANTER

## (undated) DEVICE — BAG, BANDED, 36IN X 28IN

## (undated) DEVICE — SHEATH, PINNACLE, W/O GUIDEWIRE, 10 CM,  8FR INTRODUCER, 8FR DIA, 2.5 CM DIALATOR

## (undated) DEVICE — INTRODUCER SET, MICROPUNCTURE, REG, 4FR 10CM W/SS GUIDEWIRE

## (undated) DEVICE — GLIDEWIRE, STIFF SHAFT, ANGLE TIP, .035 DIA, 180 CM,  3 CM TIP"

## (undated) DEVICE — SYRINGE, 10 CC, LUER LOCK

## (undated) DEVICE — SYRINGE, 20 CC, LUER LOCK

## (undated) DEVICE — SYRINGE, 10 CC, SLIP TIP

## (undated) DEVICE — SYRINGE, 30 CC, LUER LOCK

## (undated) DEVICE — Device

## (undated) DEVICE — CATHETER, VISIONS PV 8.2 (IVUS)